# Patient Record
Sex: MALE | Race: WHITE | NOT HISPANIC OR LATINO | Employment: OTHER | URBAN - METROPOLITAN AREA
[De-identification: names, ages, dates, MRNs, and addresses within clinical notes are randomized per-mention and may not be internally consistent; named-entity substitution may affect disease eponyms.]

---

## 2023-02-22 LAB
ALANINE AMINOTRANSFERASE (SGPT) (U/L) IN SER/PLAS: 21 U/L (ref 10–52)
ALBUMIN (G/DL) IN SER/PLAS: 4.3 G/DL (ref 3.4–5)
ALKALINE PHOSPHATASE (U/L) IN SER/PLAS: 50 U/L (ref 33–136)
ANION GAP IN SER/PLAS: 11 MMOL/L (ref 10–20)
ASPARTATE AMINOTRANSFERASE (SGOT) (U/L) IN SER/PLAS: 19 U/L (ref 9–39)
BILIRUBIN TOTAL (MG/DL) IN SER/PLAS: 0.5 MG/DL (ref 0–1.2)
CALCIUM (MG/DL) IN SER/PLAS: 9 MG/DL (ref 8.6–10.6)
CARBON DIOXIDE, TOTAL (MMOL/L) IN SER/PLAS: 28 MMOL/L (ref 21–32)
CHLORIDE (MMOL/L) IN SER/PLAS: 103 MMOL/L (ref 98–107)
CHOLESTEROL (MG/DL) IN SER/PLAS: 149 MG/DL (ref 0–199)
CHOLESTEROL IN HDL (MG/DL) IN SER/PLAS: 58.8 MG/DL
CHOLESTEROL/HDL RATIO: 2.5
COBALAMIN (VITAMIN B12) (PG/ML) IN SER/PLAS: 1124 PG/ML (ref 211–911)
CREATININE (MG/DL) IN SER/PLAS: 0.86 MG/DL (ref 0.5–1.3)
ERYTHROCYTE DISTRIBUTION WIDTH (RATIO) BY AUTOMATED COUNT: 12.9 % (ref 11.5–14.5)
ERYTHROCYTE MEAN CORPUSCULAR HEMOGLOBIN CONCENTRATION (G/DL) BY AUTOMATED: 33.3 G/DL (ref 32–36)
ERYTHROCYTE MEAN CORPUSCULAR VOLUME (FL) BY AUTOMATED COUNT: 91 FL (ref 80–100)
ERYTHROCYTES (10*6/UL) IN BLOOD BY AUTOMATED COUNT: 4.92 X10E12/L (ref 4.5–5.9)
GFR MALE: >90 ML/MIN/1.73M2
GLUCOSE (MG/DL) IN SER/PLAS: 94 MG/DL (ref 74–99)
HEMATOCRIT (%) IN BLOOD BY AUTOMATED COUNT: 45 % (ref 41–52)
HEMOGLOBIN (G/DL) IN BLOOD: 15 G/DL (ref 13.5–17.5)
LDL: 68 MG/DL (ref 0–99)
LEUKOCYTES (10*3/UL) IN BLOOD BY AUTOMATED COUNT: 9 X10E9/L (ref 4.4–11.3)
NRBC (PER 100 WBCS) BY AUTOMATED COUNT: 0 /100 WBC (ref 0–0)
PLATELETS (10*3/UL) IN BLOOD AUTOMATED COUNT: 230 X10E9/L (ref 150–450)
POTASSIUM (MMOL/L) IN SER/PLAS: 4.4 MMOL/L (ref 3.5–5.3)
PROSTATE SPECIFIC ANTIGEN,SCREEN: 0.91 NG/ML (ref 0–4)
PROTEIN TOTAL: 6.8 G/DL (ref 6.4–8.2)
SODIUM (MMOL/L) IN SER/PLAS: 138 MMOL/L (ref 136–145)
THYROTROPIN (MIU/L) IN SER/PLAS BY DETECTION LIMIT <= 0.05 MIU/L: 1.74 MIU/L (ref 0.44–3.98)
TRIGLYCERIDE (MG/DL) IN SER/PLAS: 110 MG/DL (ref 0–149)
UREA NITROGEN (MG/DL) IN SER/PLAS: 17 MG/DL (ref 6–23)
VLDL: 22 MG/DL (ref 0–40)

## 2023-04-25 DIAGNOSIS — M25.572 ACUTE LEFT ANKLE PAIN: ICD-10-CM

## 2023-04-28 DIAGNOSIS — M89.9 BONE LESION: ICD-10-CM

## 2023-07-11 PROBLEM — G47.33 OBSTRUCTIVE SLEEP APNEA: Status: ACTIVE | Noted: 2023-07-11

## 2023-07-11 PROBLEM — M19.079 ANKLE ARTHRITIS: Status: ACTIVE | Noted: 2023-07-11

## 2023-07-11 PROBLEM — M93.20 OSTEOCHONDRITIS DISSECANS: Status: ACTIVE | Noted: 2023-07-11

## 2023-07-11 PROBLEM — M19.011 LOCALIZED OSTEOARTHRITIS OF RIGHT SHOULDER: Status: ACTIVE | Noted: 2023-07-11

## 2023-07-11 PROBLEM — H90.3 ASYMMETRICAL SENSORINEURAL HEARING LOSS: Status: ACTIVE | Noted: 2023-07-11

## 2023-07-11 PROBLEM — L81.9 PIGMENTED SKIN LESION: Status: ACTIVE | Noted: 2023-07-11

## 2023-07-11 PROBLEM — M19.012 DEGENERATIVE JOINT DISEASE OF LEFT SHOULDER: Status: ACTIVE | Noted: 2023-07-11

## 2023-07-11 PROBLEM — E78.5 HYPERLIPEMIA: Status: ACTIVE | Noted: 2023-07-11

## 2023-07-11 PROBLEM — K21.9 GERD (GASTROESOPHAGEAL REFLUX DISEASE): Status: ACTIVE | Noted: 2023-07-11

## 2023-07-11 PROBLEM — N52.9 ERECTILE DYSFUNCTION: Status: ACTIVE | Noted: 2023-07-11

## 2023-07-11 PROBLEM — M62.81 GENERALIZED MUSCLE WEAKNESS: Status: ACTIVE | Noted: 2023-07-11

## 2023-07-11 RX ORDER — KETOCONAZOLE 20 MG/G
CREAM TOPICAL
COMMUNITY
Start: 2023-05-22

## 2023-07-11 RX ORDER — SILDENAFIL 100 MG/1
TABLET, FILM COATED ORAL
COMMUNITY
Start: 2017-09-13

## 2023-07-11 RX ORDER — KETOCONAZOLE 20 MG/ML
SHAMPOO, SUSPENSION TOPICAL
COMMUNITY
Start: 2015-06-08 | End: 2024-02-20 | Stop reason: ALTCHOICE

## 2023-07-31 ENCOUNTER — OFFICE VISIT (OUTPATIENT)
Dept: PRIMARY CARE | Facility: CLINIC | Age: 68
End: 2023-07-31
Payer: MEDICARE

## 2023-07-31 VITALS
WEIGHT: 220 LBS | HEIGHT: 67 IN | SYSTOLIC BLOOD PRESSURE: 134 MMHG | DIASTOLIC BLOOD PRESSURE: 85 MMHG | BODY MASS INDEX: 34.53 KG/M2

## 2023-07-31 DIAGNOSIS — R73.09 ABNORMAL GLUCOSE: ICD-10-CM

## 2023-07-31 DIAGNOSIS — Z00.00 ROUTINE GENERAL MEDICAL EXAMINATION AT A HEALTH CARE FACILITY: Primary | ICD-10-CM

## 2023-07-31 DIAGNOSIS — E78.00 PURE HYPERCHOLESTEROLEMIA: ICD-10-CM

## 2023-07-31 DIAGNOSIS — E66.01 SEVERE OBESITY (BMI 35.0-35.9 WITH COMORBIDITY) (MULTI): ICD-10-CM

## 2023-07-31 DIAGNOSIS — G47.33 OBSTRUCTIVE SLEEP APNEA: ICD-10-CM

## 2023-07-31 LAB
ALANINE AMINOTRANSFERASE (SGPT) (U/L) IN SER/PLAS: 20 U/L (ref 10–52)
ALBUMIN (G/DL) IN SER/PLAS: 4.2 G/DL (ref 3.4–5)
ALKALINE PHOSPHATASE (U/L) IN SER/PLAS: 44 U/L (ref 33–136)
ANION GAP IN SER/PLAS: 12 MMOL/L (ref 10–20)
ASPARTATE AMINOTRANSFERASE (SGOT) (U/L) IN SER/PLAS: 23 U/L (ref 9–39)
BILIRUBIN TOTAL (MG/DL) IN SER/PLAS: 0.6 MG/DL (ref 0–1.2)
CALCIUM (MG/DL) IN SER/PLAS: 9.3 MG/DL (ref 8.6–10.6)
CARBON DIOXIDE, TOTAL (MMOL/L) IN SER/PLAS: 27 MMOL/L (ref 21–32)
CHLORIDE (MMOL/L) IN SER/PLAS: 105 MMOL/L (ref 98–107)
CHOLESTEROL (MG/DL) IN SER/PLAS: 159 MG/DL (ref 0–199)
CHOLESTEROL IN HDL (MG/DL) IN SER/PLAS: 55.3 MG/DL
CHOLESTEROL/HDL RATIO: 2.9
CREATININE (MG/DL) IN SER/PLAS: 0.82 MG/DL (ref 0.5–1.3)
GFR MALE: >90 ML/MIN/1.73M2
GLUCOSE (MG/DL) IN SER/PLAS: 136 MG/DL (ref 74–99)
LDL: 86 MG/DL (ref 0–99)
POC HEMOGLOBIN A1C: 5.6 % (ref 4.2–6.5)
POTASSIUM (MMOL/L) IN SER/PLAS: 4.2 MMOL/L (ref 3.5–5.3)
PROTEIN TOTAL: 6.7 G/DL (ref 6.4–8.2)
SODIUM (MMOL/L) IN SER/PLAS: 140 MMOL/L (ref 136–145)
TRIGLYCERIDE (MG/DL) IN SER/PLAS: 89 MG/DL (ref 0–149)
UREA NITROGEN (MG/DL) IN SER/PLAS: 22 MG/DL (ref 6–23)
VLDL: 18 MG/DL (ref 0–40)

## 2023-07-31 PROCEDURE — 1036F TOBACCO NON-USER: CPT | Performed by: FAMILY MEDICINE

## 2023-07-31 PROCEDURE — 80061 LIPID PANEL: CPT

## 2023-07-31 PROCEDURE — 83036 HEMOGLOBIN GLYCOSYLATED A1C: CPT | Performed by: FAMILY MEDICINE

## 2023-07-31 PROCEDURE — 99214 OFFICE O/P EST MOD 30 MIN: CPT | Performed by: FAMILY MEDICINE

## 2023-07-31 PROCEDURE — 3008F BODY MASS INDEX DOCD: CPT | Performed by: FAMILY MEDICINE

## 2023-07-31 PROCEDURE — 99397 PER PM REEVAL EST PAT 65+ YR: CPT | Performed by: FAMILY MEDICINE

## 2023-07-31 PROCEDURE — 1159F MED LIST DOCD IN RCRD: CPT | Performed by: FAMILY MEDICINE

## 2023-07-31 PROCEDURE — 80053 COMPREHEN METABOLIC PANEL: CPT

## 2023-07-31 ASSESSMENT — PATIENT HEALTH QUESTIONNAIRE - PHQ9
1. LITTLE INTEREST OR PLEASURE IN DOING THINGS: NOT AT ALL
2. FEELING DOWN, DEPRESSED OR HOPELESS: NOT AT ALL
SUM OF ALL RESPONSES TO PHQ9 QUESTIONS 1 AND 2: 0

## 2023-07-31 NOTE — PROGRESS NOTES
"Subjective   Patient ID: Tyrone Ma is a 68 y.o. male who presents for Follow-up (6 month /Cpap follow up face to face) and Weight Management (Possible ozepmic hard to workout with left ankle problem ).      Has been compliant with CPAP using 4-8 hours per night Benefits from CPAP due to sleep apnea HTN .    Last Physical : ___1_ Years ago     Pt's PMH, PSH, SH, FH , meds and allergies was obtained / reviewed and updated .     Dental  Visits : Y  Vision issues : N  Hearing issues : N    Immunizations : Y    Diet :  Could be better   Exercise:  Not regularly  Weight concerns : None    Alcohol: as noted in SH  Tobacco: as noted in SH  Recreational drugs :  None /as noted in      Sexually active : Active   Contraception :   Erectile dysfunction :    Colorectal cancer screening   Prostate cancer screening     Metabolic screening   - Lipids   - Glucose   Taking statin arthralgias no myalgia  Has arthritis      Weight has been hard to lose    ==================================    Visit Vitals  Blood Pressure 134/85   Height 1.71 m (5' 7.32\")   Weight 99.8 kg (220 lb)   Body Mass Index 34.13 kg/m²   Smoking Status Never   Body Surface Area 2.18 m²      =====================  Review of Systems:    Constitutional: no chills, no fever and no night sweats.     Eyes: no blurred vision and no eyesight problems.     ENT: no hearing loss, no nasal congestion, no nasal discharge, no hoarseness and no sore throat.     Cardiovascular: no chest pain, no intermittent leg claudication, no lower extremity edema, no palpitations and no syncope.     Respiratory: no cough, no shortness of breath during exertion, no shortness of breath at rest and no wheezing.     Gastrointestinal: no abdominal pain, no constipation, no blood in stools, no diarrhea, no melena, no nausea, no rectal pain and no vomiting.     Genitourinary: no dysuria, no change in urinary frequency, no urinary hesitancy and no feelings of urinary urgency. "     Musculoskeletal: no arthralgias, no back pain and no myalgias.     Integumentary: no new skin lesions and no rashes.     Neurological: no difficulty walking, no headache, no limb weakness, no numbness and no tingling.     Psychiatric: no anxiety, no depression, no anhedonia and no substance use disorders.     Endocrine: no recent weight gain and no recent weight loss.     Hematologic/Lymphatic: no tendency for easy bruising and no swollen glands.          All other systems have been reviewed and are negative for complaint.    =====================================================    Physical exam :    Constitutional: Alert and in no acute distress. Well developed, well nourished.     Eyes: Normal external exam. Pupils were equal in size, round, reactive to light (PERRL) with normal accommodation and extraocular movements intact (EOMI).     Ears, Nose, Mouth, and Throat: External inspection of ears and nose: Normal.  Otoscopic examination: Normal.      Neck: No neck mass was observed. Supple.     Cardiovascular: Heart rate and rhythm were normal, normal S1 and S2, no gallops, no murmurs and no pericardial rub    Pulmonary: No respiratory distress. Clear bilateral breath sounds.     Abdomen: Soft nontender; no abdominal mass palpated. No organomegaly.     Musculoskeletal: No joint swelling seen, normal movements of all extremities. Range of motion: Normal.  Muscle strength/tone: Normal.      Skin: Normal skin color and pigmentation, normal skin turgor, and no rash.     Neurologic: Deep tendon reflexes were 2+ and symmetric. Sensation: Normal.     Psychiatric: Judgment and insight: Intact. Mood and affect: Normal.    Lymphatic : Cervical/ axillary/ groin Lns Palpable/ non palpable       Assessment/Plan    Problem List Items Addressed This Visit       Hyperlipemia     Continue medication check labs continue healthy eating         Relevant Orders    Comprehensive Metabolic Panel (Completed)    Lipid Panel (Completed)     Obstructive sleep apnea - Primary     Refer to ENT to evaluate for inspire  Continue to increase compliance with CPAP         Relevant Orders    Referral to ENT    Severe obesity (BMI 35.0-35.9 with comorbidity) (CMS/[facility])    Relevant Medications    semaglutide 0.25 mg or 0.5 mg (2 mg/3 mL) pen injector    Abnormal glucose    Relevant Orders    POCT glycosylated hemoglobin (Hb A1C) manually resulted (Completed)

## 2023-08-06 PROBLEM — R73.09 ABNORMAL GLUCOSE: Status: ACTIVE | Noted: 2023-08-06

## 2023-08-06 PROBLEM — E66.01 SEVERE OBESITY (BMI 35.0-35.9 WITH COMORBIDITY) (MULTI): Status: ACTIVE | Noted: 2023-08-06

## 2023-08-15 DIAGNOSIS — E66.01 SEVERE OBESITY (BMI 35.0-35.9 WITH COMORBIDITY) (MULTI): ICD-10-CM

## 2023-08-15 RX ORDER — SEMAGLUTIDE 0.68 MG/ML
0.25 INJECTION, SOLUTION SUBCUTANEOUS
Qty: 3 ML | Refills: 1 | Status: SHIPPED | OUTPATIENT
Start: 2023-08-15 | End: 2023-10-31 | Stop reason: SDUPTHER

## 2023-08-31 ENCOUNTER — APPOINTMENT (OUTPATIENT)
Dept: PRIMARY CARE | Facility: CLINIC | Age: 68
End: 2023-08-31
Payer: MEDICARE

## 2023-10-03 ENCOUNTER — APPOINTMENT (OUTPATIENT)
Dept: ORTHOPEDIC SURGERY | Facility: CLINIC | Age: 68
End: 2023-10-03
Payer: MEDICARE

## 2023-10-04 DIAGNOSIS — E78.5 HYPERLIPIDEMIA, UNSPECIFIED: ICD-10-CM

## 2023-10-04 RX ORDER — ROSUVASTATIN CALCIUM 10 MG/1
10 TABLET, COATED ORAL DAILY
Qty: 90 TABLET | Refills: 1 | Status: SHIPPED | OUTPATIENT
Start: 2023-10-04 | End: 2024-04-10

## 2023-10-18 ENCOUNTER — OFFICE VISIT (OUTPATIENT)
Dept: OTOLARYNGOLOGY | Facility: CLINIC | Age: 68
End: 2023-10-18
Payer: MEDICARE

## 2023-10-18 VITALS
WEIGHT: 214.2 LBS | SYSTOLIC BLOOD PRESSURE: 136 MMHG | HEIGHT: 69 IN | BODY MASS INDEX: 31.73 KG/M2 | RESPIRATION RATE: 18 BRPM | HEART RATE: 50 BPM | TEMPERATURE: 97.2 F | DIASTOLIC BLOOD PRESSURE: 88 MMHG

## 2023-10-18 DIAGNOSIS — G47.33 OBSTRUCTIVE SLEEP APNEA: Primary | ICD-10-CM

## 2023-10-18 PROCEDURE — 99203 OFFICE O/P NEW LOW 30 MIN: CPT | Performed by: STUDENT IN AN ORGANIZED HEALTH CARE EDUCATION/TRAINING PROGRAM

## 2023-10-18 PROCEDURE — 99213 OFFICE O/P EST LOW 20 MIN: CPT | Performed by: STUDENT IN AN ORGANIZED HEALTH CARE EDUCATION/TRAINING PROGRAM

## 2023-10-18 PROCEDURE — 1036F TOBACCO NON-USER: CPT | Performed by: STUDENT IN AN ORGANIZED HEALTH CARE EDUCATION/TRAINING PROGRAM

## 2023-10-18 PROCEDURE — 1160F RVW MEDS BY RX/DR IN RCRD: CPT | Performed by: STUDENT IN AN ORGANIZED HEALTH CARE EDUCATION/TRAINING PROGRAM

## 2023-10-18 PROCEDURE — 1159F MED LIST DOCD IN RCRD: CPT | Performed by: STUDENT IN AN ORGANIZED HEALTH CARE EDUCATION/TRAINING PROGRAM

## 2023-10-18 PROCEDURE — 1126F AMNT PAIN NOTED NONE PRSNT: CPT | Performed by: STUDENT IN AN ORGANIZED HEALTH CARE EDUCATION/TRAINING PROGRAM

## 2023-10-18 PROCEDURE — 3008F BODY MASS INDEX DOCD: CPT | Performed by: STUDENT IN AN ORGANIZED HEALTH CARE EDUCATION/TRAINING PROGRAM

## 2023-10-18 SDOH — ECONOMIC STABILITY: FOOD INSECURITY: WITHIN THE PAST 12 MONTHS, THE FOOD YOU BOUGHT JUST DIDN'T LAST AND YOU DIDN'T HAVE MONEY TO GET MORE.: NEVER TRUE

## 2023-10-18 SDOH — ECONOMIC STABILITY: FOOD INSECURITY: WITHIN THE PAST 12 MONTHS, YOU WORRIED THAT YOUR FOOD WOULD RUN OUT BEFORE YOU GOT MONEY TO BUY MORE.: NEVER TRUE

## 2023-10-18 ASSESSMENT — PATIENT HEALTH QUESTIONNAIRE - PHQ9
1. LITTLE INTEREST OR PLEASURE IN DOING THINGS: NOT AT ALL
SUM OF ALL RESPONSES TO PHQ9 QUESTIONS 1 AND 2: 0
2. FEELING DOWN, DEPRESSED OR HOPELESS: NOT AT ALL

## 2023-10-18 ASSESSMENT — COLUMBIA-SUICIDE SEVERITY RATING SCALE - C-SSRS
2. HAVE YOU ACTUALLY HAD ANY THOUGHTS OF KILLING YOURSELF?: NO
6. HAVE YOU EVER DONE ANYTHING, STARTED TO DO ANYTHING, OR PREPARED TO DO ANYTHING TO END YOUR LIFE?: NO
1. IN THE PAST MONTH, HAVE YOU WISHED YOU WERE DEAD OR WISHED YOU COULD GO TO SLEEP AND NOT WAKE UP?: NO

## 2023-10-18 ASSESSMENT — ENCOUNTER SYMPTOMS
LOSS OF SENSATION IN FEET: 0
DEPRESSION: 0
OCCASIONAL FEELINGS OF UNSTEADINESS: 0

## 2023-10-18 ASSESSMENT — PAIN SCALES - GENERAL: PAINLEVEL: 0-NO PAIN

## 2023-10-18 NOTE — PROGRESS NOTES
Subjective   Patient ID: Tyrone Ma is a 68 y.o. male who presents for Sleep Apnea (NPV- Sleep Apnea.).  Referred by Dr. Sol for obstructive sleep apnea and consideration of surgical intervention.    The patient recently started CPAP in May 2023. Using CPAP 4-5 hours a night. He has not appreciated much improvement in sleep quality. His PCP and sleep equipment company are currently managing his CPAP and mask optimization.  He has changed his mask somewhat and feels that this ihas improved his compliance. Notes occasional SOB which causes him to take off his mask.        Review of Systems   All other systems reviewed and are negative.    Except as noted on patient intake form and as above.    Objective   Physical Exam  Constitutional:       General: He is not in acute distress.     Appearance: Normal appearance.   HENT:      Head: Normocephalic and atraumatic.      Salivary Glands: Right salivary gland is not diffusely enlarged. Left salivary gland is not diffusely enlarged.      Right Ear: Tympanic membrane, ear canal and external ear normal.      Left Ear: Tympanic membrane, ear canal and external ear normal.      Nose: Septal deviation present. No nasal deformity, mucosal edema or rhinorrhea.      Right Turbinates: Enlarged.      Left Turbinates: Enlarged.      Comments: Right NSD. Mild ITH.     Mouth/Throat:      Mouth: Mucous membranes are moist.      Pharynx: Oropharynx is clear.      Comments: Class I airway.  Class II malocclusion.  Moderate attrition.  Eyes:      Extraocular Movements: Extraocular movements intact.      Conjunctiva/sclera: Conjunctivae normal.   Neck:      Thyroid: No thyroid mass or thyromegaly.   Pulmonary:      Effort: Pulmonary effort is normal. No respiratory distress.      Breath sounds: No stridor.   Lymphadenopathy:      Cervical: No cervical adenopathy.   Neurological:      Mental Status: He is alert.      Cranial Nerves: Cranial nerves 2-12 are intact. No cranial nerve  deficit, dysarthria or facial asymmetry.   Psychiatric:         Attention and Perception: Attention normal.         Mood and Affect: Mood normal.         Speech: Speech normal.         Behavior: Behavior is cooperative.       Sleep Study:  I personally reviewed the patient's sleep study results from March 2023.  This appears to be a good quality study.  The patient's estimated AHI was just over 50.  He spent almost 50% of this time with oxygen saturation of less than 90%.  Two thirds of his events were obstructive in the third were central or mixed in nature.  He was graded as a severe obstructive sleep apnea.    Assessment/Plan   Problem List Items Addressed This Visit             ICD-10-CM    Obstructive sleep apnea - Primary G47.33     The patient has recently been diagnosed with severe obstructive sleep apnea.  I discussed with the patient his sleep study results as well as his physical exam in detail; reassurance was provided.  The patient has severe sleep apnea that is a mixture of obstructive and central events.  Given the patient's anatomy we discussed that he is a poor candidate for consideration of [place] pharyngeal surgery.  In addition, because of his multiple central apneas he would not be a good inspire candidate.  I recommended that given his sleep study results he continue CPAP and work with his company to continue to optimize his usage.    However, if after 6 to 12 months of CPAP use he is finding persistent difficulty it would not be unreasonable to see one of my sleep surgeon colleagues to discuss options to optimize his anatomy.  He may benefit from straightening of the septum and would also likely benefit from advancement of his mandible given his overbite.    He will otherwise see me as needed.

## 2023-10-18 NOTE — LETTER
October 18, 2023     Giancarlo Sol MD  [address]  [facility] [address]  [facility] [address]    Patient: Tyrone Ma   YOB: 1955   Date of Visit: 10/18/2023       Dear Dr. Giancarlo Sol MD:    Thank you for referring Tyrone Ma to me for evaluation. Below are my notes for this consultation.  If you have questions, please do not hesitate to call me. I look forward to following your patient along with you.       Sincerely,     Pedro Hackett MD      CC: No Recipients  ______________________________________________________________________________________    Subjective   Patient ID: Tyrone Ma is a 68 y.o. male who presents for Sleep Apnea (NPV- Sleep Apnea.).  Referred by Dr. Sol for obstructive sleep apnea and consideration of surgical intervention.    The patient recently started CPAP in May 2023. Using CPAP 4-5 hours a night. He has not appreciated much improvement in sleep quality. His PCP and sleep equipment company are currently managing his CPAP and mask optimization.  He has changed his mask somewhat and feels that this ihas improved his compliance. Notes occasional SOB which causes him to take off his mask.        Review of Systems   All other systems reviewed and are negative.    Except as noted on patient intake form and as above.    Objective   Physical Exam  Constitutional:       General: He is not in acute distress.     Appearance: Normal appearance.   HENT:      Head: Normocephalic and atraumatic.      Salivary Glands: Right salivary gland is not diffusely enlarged. Left salivary gland is not diffusely enlarged.      Right Ear: Tympanic membrane, ear canal and external ear normal.      Left Ear: Tympanic membrane, ear canal and external ear normal.      Nose: Septal deviation present. No nasal deformity, mucosal edema or rhinorrhea.      Right Turbinates: Enlarged.      Left Turbinates: Enlarged.      Comments: Right NSD. Mild ITH.     Mouth/Throat:      Mouth: Mucous membranes are moist.       Pharynx: Oropharynx is clear.      Comments: Class I airway.  Class II malocclusion.  Moderate attrition.  Eyes:      Extraocular Movements: Extraocular movements intact.      Conjunctiva/sclera: Conjunctivae normal.   Neck:      Thyroid: No thyroid mass or thyromegaly.   Pulmonary:      Effort: Pulmonary effort is normal. No respiratory distress.      Breath sounds: No stridor.   Lymphadenopathy:      Cervical: No cervical adenopathy.   Neurological:      Mental Status: He is alert.      Cranial Nerves: Cranial nerves 2-12 are intact. No cranial nerve deficit, dysarthria or facial asymmetry.   Psychiatric:         Attention and Perception: Attention normal.         Mood and Affect: Mood normal.         Speech: Speech normal.         Behavior: Behavior is cooperative.       Sleep Study:  I personally reviewed the patient's sleep study results from March 2023.  This appears to be a good quality study.  The patient's estimated AHI was just over 50.  He spent almost 50% of this time with oxygen saturation of less than 90%.  Two thirds of his events were obstructive in the third were central or mixed in nature.  He was graded as a severe obstructive sleep apnea.    Assessment/Plan   Problem List Items Addressed This Visit             ICD-10-CM    Obstructive sleep apnea - Primary G47.33     The patient has recently been diagnosed with severe obstructive sleep apnea.  I discussed with the patient his sleep study results as well as his physical exam in detail; reassurance was provided.  The patient has severe sleep apnea that is a mixture of obstructive and central events.  Given the patient's anatomy we discussed that he is a poor candidate for consideration of [place] pharyngeal surgery.  In addition, because of his multiple central apneas he would not be a good inspire candidate.  I recommended that given his sleep study results he continue CPAP and work with his company to continue to optimize his  usage.    However, if after 6 to 12 months of CPAP use he is finding persistent difficulty it would not be unreasonable to see one of my sleep surgeon colleagues to discuss options to optimize his anatomy.  He may benefit from straightening of the septum and would also likely benefit from advancement of his mandible given his overbite.    He will otherwise see me as needed.

## 2023-10-19 NOTE — ASSESSMENT & PLAN NOTE
The patient has recently been diagnosed with severe obstructive sleep apnea.  I discussed with the patient his sleep study results as well as his physical exam in detail; reassurance was provided.  The patient has severe sleep apnea that is a mixture of obstructive and central events.  Given the patient's anatomy we discussed that he is a poor candidate for consideration of [place] pharyngeal surgery.  In addition, because of his multiple central apneas he would not be a good inspire candidate.  I recommended that given his sleep study results he continue CPAP and work with his company to continue to optimize his usage.    However, if after 6 to 12 months of CPAP use he is finding persistent difficulty it would not be unreasonable to see one of my sleep surgeon colleagues to discuss options to optimize his anatomy.  He may benefit from straightening of the septum and would also likely benefit from advancement of his mandible given his overbite.    He will otherwise see me as needed.

## 2023-10-30 ENCOUNTER — APPOINTMENT (OUTPATIENT)
Dept: OTOLARYNGOLOGY | Facility: CLINIC | Age: 68
End: 2023-10-30
Payer: MEDICARE

## 2023-10-31 DIAGNOSIS — E66.01 SEVERE OBESITY (BMI 35.0-35.9 WITH COMORBIDITY) (MULTI): ICD-10-CM

## 2023-10-31 RX ORDER — SEMAGLUTIDE 0.68 MG/ML
0.5 INJECTION, SOLUTION SUBCUTANEOUS
Qty: 3 ML | Refills: 0 | Status: SHIPPED | OUTPATIENT
Start: 2023-10-31 | End: 2023-12-21

## 2023-11-03 ENCOUNTER — APPOINTMENT (OUTPATIENT)
Dept: ORTHOPEDIC SURGERY | Facility: HOSPITAL | Age: 68
End: 2023-11-03
Payer: MEDICARE

## 2023-11-07 ENCOUNTER — OFFICE VISIT (OUTPATIENT)
Dept: ORTHOPEDIC SURGERY | Facility: HOSPITAL | Age: 68
End: 2023-11-07
Payer: MEDICARE

## 2023-11-07 VITALS — HEIGHT: 67 IN | WEIGHT: 207 LBS | BODY MASS INDEX: 32.49 KG/M2

## 2023-11-07 DIAGNOSIS — G89.29 CHRONIC RIGHT SHOULDER PAIN: Primary | ICD-10-CM

## 2023-11-07 DIAGNOSIS — M25.511 CHRONIC RIGHT SHOULDER PAIN: Primary | ICD-10-CM

## 2023-11-07 PROCEDURE — 1126F AMNT PAIN NOTED NONE PRSNT: CPT | Performed by: ORTHOPAEDIC SURGERY

## 2023-11-07 PROCEDURE — 99213 OFFICE O/P EST LOW 20 MIN: CPT | Performed by: ORTHOPAEDIC SURGERY

## 2023-11-07 PROCEDURE — 20610 DRAIN/INJ JOINT/BURSA W/O US: CPT | Performed by: ORTHOPAEDIC SURGERY

## 2023-11-07 PROCEDURE — 3008F BODY MASS INDEX DOCD: CPT | Performed by: ORTHOPAEDIC SURGERY

## 2023-11-07 PROCEDURE — 1160F RVW MEDS BY RX/DR IN RCRD: CPT | Performed by: ORTHOPAEDIC SURGERY

## 2023-11-07 PROCEDURE — 1036F TOBACCO NON-USER: CPT | Performed by: ORTHOPAEDIC SURGERY

## 2023-11-07 PROCEDURE — 1159F MED LIST DOCD IN RCRD: CPT | Performed by: ORTHOPAEDIC SURGERY

## 2023-11-07 ASSESSMENT — PAIN SCALES - GENERAL: PAINLEVEL_OUTOF10: 5 - MODERATE PAIN

## 2023-11-07 ASSESSMENT — PAIN - FUNCTIONAL ASSESSMENT: PAIN_FUNCTIONAL_ASSESSMENT: 0-10

## 2023-11-07 ASSESSMENT — PAIN DESCRIPTION - DESCRIPTORS: DESCRIPTORS: ACHING

## 2023-11-07 NOTE — PROGRESS NOTES
Subjective    Patient ID: Tyrone Ma is a 68 y.o. male.    Chief Complaint: Follow-up of the Right Shoulder (Injection)     Last Surgery: No surgery found  Last Surgery Date: No surgery found    The patient is a pleasant 68 year-old right hand dominant male presenting today for evaluation of their right shoulder. He is presenting today with complaints of shoulder pain that has been progressively worsening. He has had the pain for the last 3-4 years. He does not feel that the pain is terrible, but his wife says he complains about it often. He has not tried injections yet but he has done therapy. He did not have success with therapy. He notes that his left shoulder is beginning to hurt.        11/07/23  Tyrone returns to the clinic today for a repeat follow up visit regarding his right shoulder.     He explains today that the injections are helping. He is hoping to have a repeat done today.      Past medical history, surgical history, social history, and family history were all reviewed and are as per the [place] patient health history questionnaire form that I signed and scanned into the chart today.          Objective   Patient is a well-developed, well-nourished male in no acute distress. Breathes with normal chest rises. Pupils are round and symmetric today. Awake, alert, and oriented x3.     Examination of the left shoulder today reveals the skin to be intact. There is no sign any atrophy lesions or abrasions. There is no pain to palpation of the bony prominences. Cervical lymphadenopathy examined, and this was negative.   Patient had 5 out of 5 wrist flexors extension, thumb extension bilaterally. Sensation was intact to light touch to median, ulnar, radial axillary, and musculocutaneous nerves bilaterally. Positive radial pulse bilaterally. Provocative maneuvers on the left side today were negative.   Range of motion of the left shoulder revealed 0-170° of forward elevation. 0-45° of external rotation.  Internal rotation was to T 12.      Examination of the right shoulder today reveals the skin to be intact. There is no sign any atrophy lesions or abrasions. There is no pain to palpation of the bony prominences. Cervical lymphadenopathy examined, and this was negative.   Patient had 5 out of 5 wrist flexors extension, thumb extension, bilaterally. Sensation was intact to light touch to median, ulnar, radial, axillary, and musculocutaneous nerves bilaterally. Positive radial pulse bilaterally. Provocative maneuvers on the right side today were negative.   Range of motion of the right shoulder revealed 0-130° of forward elevation, passively correctable to 17. 0-35° of external rotation. And internal rotation up to T 12. Negative modified belly press test      Image Results:    Patient ID: Tyrone Ma is a 68 y.o. male.    L Inj/Asp: R subacromial bursa on 11/7/2023 8:59 AM  Indications: pain  Details: 20 G needle, anterior approach  Outcome: tolerated well, no immediate complications  Procedure, treatment alternatives, risks and benefits explained, specific risks discussed. Consent was given by the patient. Immediately prior to procedure a time out was called to verify the correct patient, procedure, equipment, support staff and site/side marked as required. Patient was prepped and draped in the usual sterile fashion.           Assessment/Plan   Encounter Diagnoses:  No diagnosis found.  Patient with endstage arthritis of the right shoulder     At this time we had a long discussion about the various options for shoulder arthritis.      1. Do nothing and continue activity modifications.   2. Consider cortisone injections into the glenohumeral joint. This would give pain relief that is temporary. This would not stop the progression of arthritis. For some patients, this injection can provide no relief at all, relief for 2 weeks, 4 weeks, 3 months or longer. The risk of the injection is fairly minimal but does include  included a very small chance of infection.   3. Option is a total shoulder replacement. This will give the patient a more permanent solution to pain relief. It would also improve function. There is no rush to this procedure it is an elective procedure.     We did a repeat injection today per his request. He tolerated this well. As he is getting relief from injections we will continue these until they stop working.   Orders Placed This Encounter    L Inj/Asp     Follow up in 3 months    Scribe Attestation  By signing my name below, I, Baltazar Roa   attest that this documentation has been prepared under the direction and in the presence of Victor Hugo Somers MD.

## 2023-12-21 DIAGNOSIS — E66.01 SEVERE OBESITY (BMI 35.0-35.9 WITH COMORBIDITY) (MULTI): ICD-10-CM

## 2023-12-21 RX ORDER — SEMAGLUTIDE 0.68 MG/ML
0.5 INJECTION, SOLUTION SUBCUTANEOUS
Qty: 3 ML | Refills: 1 | Status: SHIPPED | OUTPATIENT
Start: 2023-12-21 | End: 2024-02-20 | Stop reason: ALTCHOICE

## 2024-02-04 NOTE — PROGRESS NOTES
Subjective    Patient ID: Tyrone Ma is a 68 y.o. male.    Chief Complaint: No chief complaint on file.     Last Surgery: No surgery found  Last Surgery Date: No surgery found    The patient is a pleasant 68 year-old right hand dominant male presenting today for evaluation of their right shoulder. He is presenting today with complaints of shoulder pain that has been progressively worsening. He has had the pain for the last 3-4 years. He does not feel that the pain is terrible, but his wife says he complains about it often. He has not tried injections yet but he has done therapy. He did not have success with therapy. He notes that his left shoulder is beginning to hurt.        11/07/23  Tyrone returns to the clinic today for a repeat follow up visit regarding his right shoulder.     He explains today that the injections are helping. He is hoping to have a repeat done today.     02/06/24  Tyrone returns to the clinic today for a follow up visit regarding his right shoulder.     He reports that the injections are continuing to take the edge off his shoulder pain.      Past medical history, surgical history, social history, and family history were all reviewed and are as per the [place] patient health history questionnaire form that I signed and scanned into the chart today.          Objective   Patient is a well-developed, well-nourished male in no acute distress. Breathes with normal chest rises. Pupils are round and symmetric today. Awake, alert, and oriented x3.     Examination of the left shoulder today reveals the skin to be intact. There is no sign any atrophy lesions or abrasions. There is no pain to palpation of the bony prominences. Cervical lymphadenopathy examined, and this was negative.   Patient had 5 out of 5 wrist flexors extension, thumb extension bilaterally. Sensation was intact to light touch to median, ulnar, radial axillary, and musculocutaneous nerves bilaterally. Positive radial pulse  bilaterally. Provocative maneuvers on the left side today were negative.   Range of motion of the left shoulder revealed 0-170° of forward elevation. 0-45° of external rotation. Internal rotation was to T 12.      Examination of the right shoulder today reveals the skin to be intact. There is no sign any atrophy lesions or abrasions. There is no pain to palpation of the bony prominences. Cervical lymphadenopathy examined, and this was negative.   Patient had 5 out of 5 wrist flexors extension, thumb extension, bilaterally. Sensation was intact to light touch to median, ulnar, radial, axillary, and musculocutaneous nerves bilaterally. Positive radial pulse bilaterally. Provocative maneuvers on the right side today were negative.   Range of motion of the right shoulder revealed 0-130° of forward elevation, passively correctable to 17. 0-35° of external rotation. And internal rotation up to T 12. Negative modified belly press test      Image Results:    Patient ID: Tyrone Ma is a 68 y.o. male.    L Inj/Asp: R subacromial bursa on 2/6/2024 8:26 AM  Indications: pain  Details: 20 G needle, anterior approach  Outcome: tolerated well, no immediate complications  Procedure, treatment alternatives, risks and benefits explained, specific risks discussed. Consent was given by the patient. Immediately prior to procedure a time out was called to verify the correct patient, procedure, equipment, support staff and site/side marked as required. Patient was prepped and draped in the usual sterile fashion.           Assessment/Plan   Encounter Diagnoses:  No diagnosis found.  Patient with endstage arthritis of the right shoulder     At this time we had a long discussion about the various options for shoulder arthritis.      1. Do nothing and continue activity modifications.   2. Consider cortisone injections into the glenohumeral joint. This would give pain relief that is temporary. This would not stop the progression of  arthritis. For some patients, this injection can provide no relief at all, relief for 2 weeks, 4 weeks, 3 months or longer. The risk of the injection is fairly minimal but does include included a very small chance of infection.   3. Option is a total shoulder replacement. This will give the patient a more permanent solution to pain relief. It would also improve function. There is no rush to this procedure it is an elective procedure.     We did a repeat injection today as he is getting some relief from them still.  He tolerated this well. We will continue with injections until he is ready to consider a shoulder replacement. We provided him with a shoulder replacement handout today as well.   No orders of the defined types were placed in this encounter.    Follow up in 3 months    Scribe Attestation  By signing my name below, IErica Scribe   attest that this documentation has been prepared under the direction and in the presence of Victor Hugo Somers MD.

## 2024-02-06 ENCOUNTER — OFFICE VISIT (OUTPATIENT)
Dept: ORTHOPEDIC SURGERY | Facility: HOSPITAL | Age: 69
End: 2024-02-06
Payer: MEDICARE

## 2024-02-06 DIAGNOSIS — M25.511 CHRONIC RIGHT SHOULDER PAIN: Primary | ICD-10-CM

## 2024-02-06 DIAGNOSIS — G89.29 CHRONIC RIGHT SHOULDER PAIN: Primary | ICD-10-CM

## 2024-02-06 PROCEDURE — 3008F BODY MASS INDEX DOCD: CPT | Performed by: ORTHOPAEDIC SURGERY

## 2024-02-06 PROCEDURE — 99213 OFFICE O/P EST LOW 20 MIN: CPT | Performed by: ORTHOPAEDIC SURGERY

## 2024-02-06 PROCEDURE — 1126F AMNT PAIN NOTED NONE PRSNT: CPT | Performed by: ORTHOPAEDIC SURGERY

## 2024-02-06 PROCEDURE — 1159F MED LIST DOCD IN RCRD: CPT | Performed by: ORTHOPAEDIC SURGERY

## 2024-02-06 PROCEDURE — 20610 DRAIN/INJ JOINT/BURSA W/O US: CPT | Performed by: ORTHOPAEDIC SURGERY

## 2024-02-06 PROCEDURE — 1036F TOBACCO NON-USER: CPT | Performed by: ORTHOPAEDIC SURGERY

## 2024-02-06 PROCEDURE — 99213 OFFICE O/P EST LOW 20 MIN: CPT | Mod: 25,27 | Performed by: ORTHOPAEDIC SURGERY

## 2024-02-20 ENCOUNTER — OFFICE VISIT (OUTPATIENT)
Dept: PRIMARY CARE | Facility: CLINIC | Age: 69
End: 2024-02-20
Payer: MEDICARE

## 2024-02-20 VITALS
WEIGHT: 210 LBS | DIASTOLIC BLOOD PRESSURE: 76 MMHG | SYSTOLIC BLOOD PRESSURE: 111 MMHG | BODY MASS INDEX: 31.83 KG/M2 | HEIGHT: 68 IN | HEART RATE: 68 BPM

## 2024-02-20 DIAGNOSIS — Z12.5 PROSTATE CANCER SCREENING: ICD-10-CM

## 2024-02-20 DIAGNOSIS — R73.09 ABNORMAL GLUCOSE: ICD-10-CM

## 2024-02-20 DIAGNOSIS — E78.00 PURE HYPERCHOLESTEROLEMIA: ICD-10-CM

## 2024-02-20 DIAGNOSIS — G47.33 OBSTRUCTIVE SLEEP APNEA: ICD-10-CM

## 2024-02-20 DIAGNOSIS — Z00.00 ROUTINE GENERAL MEDICAL EXAMINATION AT HEALTH CARE FACILITY: Primary | ICD-10-CM

## 2024-02-20 DIAGNOSIS — E66.01 SEVERE OBESITY (BMI 35.0-35.9 WITH COMORBIDITY) (MULTI): ICD-10-CM

## 2024-02-20 LAB
ERYTHROCYTE [DISTWIDTH] IN BLOOD BY AUTOMATED COUNT: 12.7 % (ref 11.5–14.5)
HCT VFR BLD AUTO: 44.5 % (ref 41–52)
HGB BLD-MCNC: 15 G/DL (ref 13.5–17.5)
MCH RBC QN AUTO: 30.3 PG (ref 26–34)
MCHC RBC AUTO-ENTMCNC: 33.7 G/DL (ref 32–36)
MCV RBC AUTO: 90 FL (ref 80–100)
NRBC BLD-RTO: 0 /100 WBCS (ref 0–0)
PLATELET # BLD AUTO: 233 X10*3/UL (ref 150–450)
POC HEMOGLOBIN A1C: 5.4 % (ref 4.2–6.5)
RBC # BLD AUTO: 4.95 X10*6/UL (ref 4.5–5.9)
WBC # BLD AUTO: 9.9 X10*3/UL (ref 4.4–11.3)

## 2024-02-20 PROCEDURE — G0103 PSA SCREENING: HCPCS

## 2024-02-20 PROCEDURE — 1036F TOBACCO NON-USER: CPT | Performed by: FAMILY MEDICINE

## 2024-02-20 PROCEDURE — 83036 HEMOGLOBIN GLYCOSYLATED A1C: CPT | Performed by: FAMILY MEDICINE

## 2024-02-20 PROCEDURE — 80053 COMPREHEN METABOLIC PANEL: CPT

## 2024-02-20 PROCEDURE — 3008F BODY MASS INDEX DOCD: CPT | Performed by: FAMILY MEDICINE

## 2024-02-20 PROCEDURE — 1126F AMNT PAIN NOTED NONE PRSNT: CPT | Performed by: FAMILY MEDICINE

## 2024-02-20 PROCEDURE — 1124F ACP DISCUSS-NO DSCNMKR DOCD: CPT | Performed by: FAMILY MEDICINE

## 2024-02-20 PROCEDURE — 99214 OFFICE O/P EST MOD 30 MIN: CPT | Performed by: FAMILY MEDICINE

## 2024-02-20 PROCEDURE — 85027 COMPLETE CBC AUTOMATED: CPT

## 2024-02-20 PROCEDURE — 1159F MED LIST DOCD IN RCRD: CPT | Performed by: FAMILY MEDICINE

## 2024-02-20 PROCEDURE — 36415 COLL VENOUS BLD VENIPUNCTURE: CPT

## 2024-02-20 PROCEDURE — 1170F FXNL STATUS ASSESSED: CPT | Performed by: FAMILY MEDICINE

## 2024-02-20 PROCEDURE — G0439 PPPS, SUBSEQ VISIT: HCPCS | Performed by: FAMILY MEDICINE

## 2024-02-20 PROCEDURE — 84443 ASSAY THYROID STIM HORMONE: CPT

## 2024-02-20 PROCEDURE — 80061 LIPID PANEL: CPT

## 2024-02-20 PROCEDURE — G0447 BEHAVIOR COUNSEL OBESITY 15M: HCPCS | Performed by: FAMILY MEDICINE

## 2024-02-20 PROCEDURE — 1160F RVW MEDS BY RX/DR IN RCRD: CPT | Performed by: FAMILY MEDICINE

## 2024-02-20 ASSESSMENT — ACTIVITIES OF DAILY LIVING (ADL)
GROCERY_SHOPPING: INDEPENDENT
BATHING: INDEPENDENT
DRESSING: INDEPENDENT
TAKING_MEDICATION: INDEPENDENT
DOING_HOUSEWORK: INDEPENDENT
MANAGING_FINANCES: INDEPENDENT

## 2024-02-20 ASSESSMENT — ENCOUNTER SYMPTOMS
LOSS OF SENSATION IN FEET: 0
OCCASIONAL FEELINGS OF UNSTEADINESS: 0
DEPRESSION: 0

## 2024-02-20 NOTE — PROGRESS NOTES
"Subjective   Reason for Visit: Tyrone Ma is an 68 y.o. male here for a Medicare Wellness visit.     Past Medical, Surgical, and Family History reviewed and updated in chart.    Reviewed all medications by prescribing practitioner or clinical pharmacist (such as prescriptions, OTCs, herbal therapies and supplements) and documented in the medical record.    HPI  HTN well controlled  Benson statin    Patient Care Team:  Giancarlo Sol MD as PCP - General  Giancarlo Sol MD as PCP - [employer] PCP     Review of Systems   Constitutional:  Negative for chills and fever.   HENT: Negative.     Respiratory: Negative.     Cardiovascular: Negative.    Gastrointestinal: Negative.  Negative for nausea and vomiting.   Endocrine: Negative.    Genitourinary: Negative.    Musculoskeletal: Negative.    Skin: Negative.  Negative for rash.   Allergic/Immunologic: Negative.    Neurological: Negative.    Hematological: Negative.    Psychiatric/Behavioral: Negative.     All other systems reviewed and are negative.      Objective   Vitals:  Blood Pressure 111/76   Pulse 68   Height 1.715 m (5' 7.5\")   Weight 95.3 kg (210 lb)   Body Mass Index 32.41 kg/m²       Physical Exam  Constitutional:       Appearance: Normal appearance.   Cardiovascular:      Rate and Rhythm: Normal rate and regular rhythm.   Pulmonary:      Effort: Pulmonary effort is normal.      Breath sounds: Normal breath sounds.   Abdominal:      General: Bowel sounds are normal.   Neurological:      General: No focal deficit present.      Mental Status: He is alert.   Psychiatric:         Mood and Affect: Mood normal.         Assessment/Plan   Problem List Items Addressed This Visit       Hyperlipemia    Current Assessment & Plan     Continue medication check labs continue healthy eating         Relevant Orders    Comprehensive Metabolic Panel (Completed)    Lipid Panel (Completed)    Thyroid Stimulating Hormone (Completed)    CBC (Completed)    " Hemoglobin A1C    PSA (Completed)    Obstructive sleep apnea    Current Assessment & Plan     Rec using CPAP as ordered         Severe obesity (BMI 35.0-35.9 with comorbidity) (CMS/[facility])    Current Assessment & Plan     Watch what you eat and include more vegetables on your plate.  Portion control and exercise will help in loosing weight .   It is recommended to get 150 mins of brisk exercise in a week . 150 mins of exercise per week will help in maintaining your current weight, if you are already working out . Exercise should make your heart rate go up.   Calorie deficit ( spending more calories than eating ) will result in weight loss    A deficit of 500 calories per day in 7 days would results in 1lbs weight loss per week., Aim for 1-2 lbs weight loss per month.   You can reduce the calorie intake by 250 calories and spend 250 calories by working out which would give you a total deficit of 500 calories     No sugary/ sweetened beverages , portion control , dedicated physical activity atleast 5 times a week advised .            Abnormal glucose    Relevant Orders    POCT glycosylated hemoglobin (Hb A1C) manually resulted (Completed)    Comprehensive Metabolic Panel (Completed)    Lipid Panel (Completed)    Thyroid Stimulating Hormone (Completed)    CBC (Completed)    Hemoglobin A1C    PSA (Completed)    Routine general medical examination at health care facility - Primary    Prostate cancer screening    Relevant Orders    Comprehensive Metabolic Panel (Completed)    Lipid Panel (Completed)    Thyroid Stimulating Hormone (Completed)    CBC (Completed)    Hemoglobin A1C    PSA (Completed)    BMI 32.0-32.9,adult    Relevant Medications    tirzepatide, weight loss, (Zepbound) 2.5 mg/0.5 mL injection    Other Relevant Orders    Comprehensive Metabolic Panel (Completed)    Lipid Panel (Completed)    Thyroid Stimulating Hormone (Completed)    CBC (Completed)    Hemoglobin A1C    PSA (Completed)

## 2024-02-21 LAB
ALBUMIN SERPL BCP-MCNC: 4.2 G/DL (ref 3.4–5)
ALP SERPL-CCNC: 45 U/L (ref 33–136)
ALT SERPL W P-5'-P-CCNC: 20 U/L (ref 10–52)
ANION GAP SERPL CALC-SCNC: 13 MMOL/L (ref 10–20)
AST SERPL W P-5'-P-CCNC: 20 U/L (ref 9–39)
BILIRUB SERPL-MCNC: 0.6 MG/DL (ref 0–1.2)
BUN SERPL-MCNC: 18 MG/DL (ref 6–23)
CALCIUM SERPL-MCNC: 9.4 MG/DL (ref 8.6–10.6)
CHLORIDE SERPL-SCNC: 104 MMOL/L (ref 98–107)
CHOLEST SERPL-MCNC: 165 MG/DL (ref 0–199)
CHOLESTEROL/HDL RATIO: 2.8
CO2 SERPL-SCNC: 27 MMOL/L (ref 21–32)
CREAT SERPL-MCNC: 0.66 MG/DL (ref 0.5–1.3)
EGFRCR SERPLBLD CKD-EPI 2021: >90 ML/MIN/1.73M*2
GLUCOSE SERPL-MCNC: 77 MG/DL (ref 74–99)
HDLC SERPL-MCNC: 59.8 MG/DL
LDLC SERPL CALC-MCNC: 91 MG/DL
NON HDL CHOLESTEROL: 105 MG/DL (ref 0–149)
POTASSIUM SERPL-SCNC: 4.3 MMOL/L (ref 3.5–5.3)
PROT SERPL-MCNC: 6.5 G/DL (ref 6.4–8.2)
PSA SERPL-MCNC: 0.91 NG/ML
SODIUM SERPL-SCNC: 140 MMOL/L (ref 136–145)
TRIGL SERPL-MCNC: 69 MG/DL (ref 0–149)
TSH SERPL-ACNC: 1.63 MIU/L (ref 0.44–3.98)
VLDL: 14 MG/DL (ref 0–40)

## 2024-02-25 PROBLEM — Z12.5 PROSTATE CANCER SCREENING: Status: ACTIVE | Noted: 2024-02-25

## 2024-02-25 PROBLEM — Z00.00 ROUTINE GENERAL MEDICAL EXAMINATION AT HEALTH CARE FACILITY: Status: ACTIVE | Noted: 2024-02-25

## 2024-02-25 PROBLEM — Z00.00 ROUTINE GENERAL MEDICAL EXAMINATION AT A HEALTH CARE FACILITY: Status: ACTIVE | Noted: 2024-02-25

## 2024-02-25 ASSESSMENT — ENCOUNTER SYMPTOMS
CHILLS: 0
FEVER: 0
NEUROLOGICAL NEGATIVE: 1
CARDIOVASCULAR NEGATIVE: 1
MUSCULOSKELETAL NEGATIVE: 1
ALLERGIC/IMMUNOLOGIC NEGATIVE: 1
PSYCHIATRIC NEGATIVE: 1
NAUSEA: 0
GASTROINTESTINAL NEGATIVE: 1
RESPIRATORY NEGATIVE: 1
HEMATOLOGIC/LYMPHATIC NEGATIVE: 1
VOMITING: 0
ENDOCRINE NEGATIVE: 1

## 2024-02-26 ENCOUNTER — TELEPHONE (OUTPATIENT)
Dept: DERMATOLOGY | Facility: CLINIC | Age: 69
End: 2024-02-26
Payer: MEDICARE

## 2024-02-26 NOTE — TELEPHONE ENCOUNTER
Left detailed message for pt to call [place]  back to confirm appt rescheduled from 5/23 due to provider out of the office -ag 2/26/2024

## 2024-04-10 DIAGNOSIS — E78.5 HYPERLIPIDEMIA, UNSPECIFIED: ICD-10-CM

## 2024-04-10 RX ORDER — ROSUVASTATIN CALCIUM 10 MG/1
10 TABLET, COATED ORAL DAILY
Qty: 90 TABLET | Refills: 0 | Status: SHIPPED | OUTPATIENT
Start: 2024-04-10

## 2024-04-17 ENCOUNTER — OFFICE VISIT (OUTPATIENT)
Dept: OTOLARYNGOLOGY | Facility: CLINIC | Age: 69
End: 2024-04-17
Payer: MEDICARE

## 2024-04-17 VITALS
BODY MASS INDEX: 33.12 KG/M2 | TEMPERATURE: 98.5 F | SYSTOLIC BLOOD PRESSURE: 124 MMHG | HEIGHT: 67 IN | HEART RATE: 52 BPM | DIASTOLIC BLOOD PRESSURE: 80 MMHG | WEIGHT: 211 LBS | OXYGEN SATURATION: 98 % | RESPIRATION RATE: 16 BRPM

## 2024-04-17 DIAGNOSIS — T17.320D CHOKING DUE TO FOOD IN LARYNX, SUBSEQUENT ENCOUNTER: Primary | ICD-10-CM
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
## 2024-04-17 DIAGNOSIS — R49.0 DYSPHONIA: ICD-10-CM

## 2024-04-17 DIAGNOSIS — R13.10 DYSPHAGIA, UNSPECIFIED TYPE: ICD-10-CM

## 2024-04-17 PROCEDURE — 1160F RVW MEDS BY RX/DR IN RCRD: CPT | Performed by: STUDENT IN AN ORGANIZED HEALTH CARE EDUCATION/TRAINING PROGRAM

## 2024-04-17 PROCEDURE — 1126F AMNT PAIN NOTED NONE PRSNT: CPT | Performed by: STUDENT IN AN ORGANIZED HEALTH CARE EDUCATION/TRAINING PROGRAM

## 2024-04-17 PROCEDURE — 99213 OFFICE O/P EST LOW 20 MIN: CPT | Performed by: STUDENT IN AN ORGANIZED HEALTH CARE EDUCATION/TRAINING PROGRAM

## 2024-04-17 PROCEDURE — 1159F MED LIST DOCD IN RCRD: CPT | Performed by: STUDENT IN AN ORGANIZED HEALTH CARE EDUCATION/TRAINING PROGRAM

## 2024-04-17 PROCEDURE — 1123F ACP DISCUSS/DSCN MKR DOCD: CPT | Performed by: STUDENT IN AN ORGANIZED HEALTH CARE EDUCATION/TRAINING PROGRAM

## 2024-04-17 PROCEDURE — 31575 DIAGNOSTIC LARYNGOSCOPY: CPT | Performed by: STUDENT IN AN ORGANIZED HEALTH CARE EDUCATION/TRAINING PROGRAM

## 2024-04-17 PROCEDURE — 3008F BODY MASS INDEX DOCD: CPT | Performed by: STUDENT IN AN ORGANIZED HEALTH CARE EDUCATION/TRAINING PROGRAM

## 2024-04-17 RX ORDER — OMEPRAZOLE 40 MG/1
CAPSULE, DELAYED RELEASE ORAL
Qty: 30 CAPSULE | Refills: 1 | Status: SHIPPED | OUTPATIENT
Start: 2024-04-17 | End: 2024-05-09

## 2024-04-17 ASSESSMENT — LIFESTYLE VARIABLES
HOW OFTEN DO YOU HAVE SIX OR MORE DRINKS ON ONE OCCASION: NEVER
SKIP TO QUESTIONS 9-10: 1
AUDIT-C TOTAL SCORE: 0
HOW OFTEN DO YOU HAVE A DRINK CONTAINING ALCOHOL: NEVER
HOW MANY STANDARD DRINKS CONTAINING ALCOHOL DO YOU HAVE ON A TYPICAL DAY: PATIENT DOES NOT DRINK

## 2024-04-17 ASSESSMENT — ENCOUNTER SYMPTOMS
OCCASIONAL FEELINGS OF UNSTEADINESS: 0
LOSS OF SENSATION IN FEET: 0
DEPRESSION: 0

## 2024-04-17 ASSESSMENT — PAIN SCALES - GENERAL: PAINLEVEL: 0-NO PAIN

## 2024-04-17 NOTE — PROGRESS NOTES
"Subjective   Patient ID: Tyrone Ma is a 69 y.o. male who presents for trouble swallowing.    History 10/18/2023: Referred by Dr. Sol for obstructive sleep apnea and consideration of surgical intervention.    The patient recently started CPAP in May 2023. Using CPAP 4-5 hours a night. He has not appreciated much improvement in sleep quality. His PCP and sleep equipment company are currently managing his CPAP and mask optimization.  He has changed his mask somewhat and feels that this ihas improved his compliance. Notes occasional SOB which causes him to take off his mask.    Update 4/17/2024:  Recently had environmental allergies exacerbation starting March 20. Was associated with trouble with his swallowing; noting difficulty with swallowing. Has a gagging sensation and sensation of regurgitation. Has had only a few episodes of regurgitation. Finds that bread, salad, and meats are harder to get down. No nausea/bloating. Was seen at an urgent care and prescribed Augmentin for possible sinus infection; did not appreciate improvement. Has intermittently tried some Protonix. His voice has gotten \"gravelly.\" Never smoker.    Objective   Physical Exam  CONSTITUTIONAL: Well appearing male who appears stated age.  PSYCHIATRIC: Alert, appropriate mood and affect.  RESPIRATORY: Normal inspiration and expiration and chest wall expansion; no use of accessory muscles to breathe.  VOICE: Clear speech with moderate hoarseness. Rough, \"gravelly\" voicing. No stridor nor stertor. Very strong gag.  HEAD, FACE, AND SKIN: Symmetric facial feature.  ORAL CAVITY: Lips were without lesions. Moist mucous membranes. No lesions appreciated along the gingiva, oral mucosa, nor tongue.  DENTITION: Grossly normal without obvious infection nor inflammation. Moderate attrition.  OROPHARYNX: No lesion nor mucosal abnormality. The uvula was normal appearing. The tonsils were 2+. Prominent gag.  NECK: Visualization and palpation of the neck " revealed no mass lesions, no thyromegaly or thyroid masses. No cutaneous lesions appreciated. No pain with manipulation laryngeal complex.  LYMPHATICS (CERVICAL): There were no palpable lymph nodes in the posterior triangle, submandibular triangle, jugulodigastric region, nor central neck.    --------------------------------------------------  Procedure: Flexible Laryngoscopy - Diagnostic  Indication: dysphagia, dysphonia  Informed consent verbally obtained: The risks, benefits, alternatives, and expectations were discussed with the patient, who wished to proceed  Anesthesia: Topical lidocaine/oxymetazoline    Findings: After topical anesthetic was applied the flexible endoscope was advanced into the naris. The nasal cavity, nasopharynx, oropharynx, hypopharynx, and larynx were evaluated and were normal with the following significant findings or exceptions:    - Clear drainage seated in the nasopharynx  - No masses/lesions appreciated  - Normal and symmetric TVC motion bilaterally  - Good closure with possible mild glottal gap; difficult to assess secondary to supraglottic squeeze  - There is mild to moderate supraglottic/arytenoid and post-cricoid edema  - No pooling of secretions    There were no complications and the patient tolerated the procedure well.  --------------------------------------------------    Assessment/Plan   Diagnoses and all orders for this visit:  Choking due to food in larynx, subsequent encounter  -     FL modified barium swallow study; Future  Dysphagia, unspecified type  -     FL modified barium swallow study; Future  -     omeprazole (PriLOSEC) 40 mg DR capsule; Take 1 capsule by mouth once daily before breakfast. Do not crush or chew.  Dysphonia      Dysphagia, choking episodes, dysphonia  The patient reports that starting in March 2024 he began having increasing episodes of difficulty with swallowing.  These have been associated with change in voice.  On initial flexible laryngoscopy I  appreciate some clear drainage within the nasopharynx as well as generalized edema in the supraglottis and postcricoid areas.    I discussed my findings with the patient and offered reassurance and counseling. 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    I recommended the following therapeutic measures to address the issues noted above:  - We will start a short-term trial with a daily PPI. Instructions on how to take prior to meals given.  - MBS to better assess functional swallow.  - Follow up in 6-8 weeks  - We will discuss further options should medical management not succeed.    2.  Obstructive sleep apnea  The patient was previously diagnosed with severe obstructive sleep apnea.  I previously discussed with the patient his sleep study results as well as his physical exam in detail; reassurance was provided.  The patient has severe sleep apnea that is a mixture of obstructive and central events.  Given the patient's anatomy we discussed that he is a poor candidate for consideration of palato-pharyngeal surgery.  In addition, because of his multiple central apneas he would not be a good inspire candidate.  I recommended that given his sleep study results he continue CPAP and work with his company to continue to optimize his usage.     However, if after 6 to 12 months of CPAP use he is finding persistent difficulty it would not be unreasonable to see one of my sleep surgeon colleagues to discuss options to optimize his anatomy.  He may benefit from straightening of the septum and would also likely benefit from advancement of his mandible given his overbite.     This note was created using speech recognition transcription software. Despite proofreading, typographical errors may be present that affect the meaning of the content. Please contact my office with any questions.

## 2024-04-26 ENCOUNTER — HOSPITAL ENCOUNTER (OUTPATIENT)
Dept: RADIOLOGY | Facility: HOSPITAL | Age: 69
Discharge: HOME | End: 2024-04-26
Payer: MEDICARE

## 2024-04-26 DIAGNOSIS — T17.320D CHOKING DUE TO FOOD IN LARYNX, SUBSEQUENT ENCOUNTER: ICD-10-CM
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
## 2024-04-26 DIAGNOSIS — R13.10 DYSPHAGIA, UNSPECIFIED TYPE: ICD-10-CM

## 2024-04-26 PROCEDURE — 74230 X-RAY XM SWLNG FUNCJ C+: CPT | Performed by: RADIOLOGY

## 2024-04-26 PROCEDURE — 92611 MOTION FLUOROSCOPY/SWALLOW: CPT | Mod: GN | Performed by: SPEECH-LANGUAGE PATHOLOGIST

## 2024-04-26 PROCEDURE — 74230 X-RAY XM SWLNG FUNCJ C+: CPT

## 2024-04-26 PROCEDURE — 2500000001 HC RX 250 WO HCPCS SELF ADMINISTERED DRUGS (ALT 637 FOR MEDICARE OP): Performed by: STUDENT IN AN ORGANIZED HEALTH CARE EDUCATION/TRAINING PROGRAM

## 2024-04-26 RX ADMIN — BARIUM SULFATE 60 ML: 400 SUSPENSION ORAL at 16:25

## 2024-04-29 NOTE — PROCEDURES
"Speech-Language Pathology    OP Modified Barium Swallow Study     Patient Name: Tyrone Ma  MRN: [id number]  : 1955  Today's Date: 24        MBSS completed. Informed verbal consent obtained prior to completion of exam. Trials of thin, nectar thick, puree, soft-solids, and regular solids given, + barium tablet.   Radiology room #2 is down, therefore the c-arm was utilized for this study. Study was completed on , however d/t lengthy time for images to transfer, this report is written after review of images on 24. Unfortunately, it appears some images may not have saved/transferred. Pt is pleasant, appears anxious, voice is clear/audible, do not appreciate any gravelly or raspy voice in conversation.     Recommendations:  Ok to continue a regular textured diet and thin liquids.   General aspiration precautions, fully upright for meals/meds  Small bites, slow rate of PO intake d/t changes in swallow with aging.   Do not talk when chewing/swallowing.   Reflux precautions.   May benefit from GI consult.      Assessment/Impression:    Full detailed SLP/Radiologist Modified Barium Swallow study report can be found under Chart Review tab, Imaging tab and  titled \"FL Modified Barium Swallow Study\"      Mechanics of the swallow summary:  Oral phase: Intact  Pharyngeal phase: Intact  Esophageal phase: Possible ?slower moving barium through distal esophagus as noted on a-p view with puree.     SLP impressions with severity rating:   Intact oral and pharyngeal phases of the swallow.   Bolus moves safely and efficiently from oral cavity, through pharynx, and empties into the esophagus without obstruction or airway compromise.   No significant oral or pharyngeal residue after the swallow.   No laryngeal penetration.   No aspiration.     Rosenbek Scale  Thin Liquids (MBSS)   Rosenbek's Penetration Aspiration Scale, Thin Liquids (MBSS):  [1. NO ASPIRATION & NO PENETRATION - no aspiration, contrast does not " enter airway]  Nectar Thick Liquids (MBSS)   Rosenbek's Penetration Aspiration Scale, Nectar thick liquids (MBSS):  [1. NO ASPIRATION & NO PENETRATION - no aspiration, contrast does not enter airway]  Soft solids (MBSS)   Rosenbek's Penetration Aspiration Scale, soft solids (MBSS):  [1. NO ASPIRATION & NO PENETRATION - no aspiration, contrast does not enter airway]  Purees (MBSS)   Rosenbek's Penetration Aspiration Scale, Purees (MBSS):  [1. NO ASPIRATION & NO PENETRATION - no aspiration, contrast does not enter airway]  Solids (MBSS)   Rosenbek's Penetration Aspiration Scale, Solids (MBSS):  [1. NO ASPIRATION & NO PENETRATION - no aspiration, contrast does not enter airway]    Reason for referral: Pt has complaint of some difficulty swallowing lately, more-so with bread, meat, salad. He reported gagging sensation and sensation of regurg.   Patient hx: Environmental allergies exacerbation starting March 20, c/o gravelly voice, harder to swallow. Seen by ENT and had a flexible laryngoscope, referral for instrumental swallow study.     Respiratory status: Room air   Previous diet: Does not modify diet by texture, self monitors his swallow, chews thoroughly before swallowing.     Follow-up speech therapy recommended: No  Education provided: Pt verbally provided with the results and recommendations of this instrumental swallow study. Video images reviewed during the session with patient, so that he might have a better understanding of the flow of the bolus from oral cavity, through pharynx, and emptying into esophagus.     Repeat study/ dc plan: As needed

## 2024-04-30 DIAGNOSIS — R13.10 DYSPHAGIA, UNSPECIFIED TYPE: Primary | ICD-10-CM

## 2024-05-05 NOTE — PROGRESS NOTES
Subjective    Patient ID: Tyrone Ma is a 69 y.o. male.    Chief Complaint: No chief complaint on file.     Last Surgery: No surgery found  Last Surgery Date: No surgery found    HPI  The patient is a pleasant 68 year-old right hand dominant male presenting today for evaluation of their right shoulder. He is presenting today with complaints of shoulder pain that has been progressively worsening. He has had the pain for the last 3-4 years. He does not feel that the pain is terrible, but his wife says he complains about it often. He has not tried injections yet but he has done therapy. He did not have success with therapy. He notes that his left shoulder is beginning to hurt.      11/07/23  Tyrone returns to the clinic today for a repeat follow up visit regarding his right shoulder.     He explains today that the injections are helping. He is hoping to have a repeat done today.     02/06/24  Tyrone returns to the clinic today for a follow up visit regarding his right shoulder.     He reports that the injections are continuing to take the edge off his shoulder pain.     05/07/24  Tyrone returns to the clinic today for a repeat follow up visit regarding his right shoulder.     He reports that injections are still beneficial. He notices when his injection is about to wear off as he starts to hurt.      Past medical history, surgical history, social history, and family history were all reviewed and are as per the [place] patient health history questionnaire form that I signed and scanned into the chart today.    Objective   Patient is a well-developed, well-nourished male in no acute distress. Breathes with normal chest rises. Pupils are round and symmetric today. Awake, alert, and oriented x3.     Examination of the left shoulder today reveals the skin to be intact. There is no sign any atrophy lesions or abrasions. There is no pain to palpation of the bony prominences. Cervical lymphadenopathy examined, and this was  negative.   Patient had 5 out of 5 wrist flexors extension, thumb extension bilaterally. Sensation was intact to light touch to median, ulnar, radial axillary, and musculocutaneous nerves bilaterally. Positive radial pulse bilaterally. Provocative maneuvers on the left side today were negative.   Range of motion of the left shoulder revealed 0-170° of forward elevation. 0-45° of external rotation. Internal rotation was to T 12.      Examination of the right shoulder today reveals the skin to be intact. There is no sign any atrophy lesions or abrasions. There is no pain to palpation of the bony prominences. Cervical lymphadenopathy examined, and this was negative.   Patient had 5 out of 5 wrist flexors extension, thumb extension, bilaterally. Sensation was intact to light touch to median, ulnar, radial, axillary, and musculocutaneous nerves bilaterally. Positive radial pulse bilaterally. Provocative maneuvers on the right side today were negative.   Range of motion of the right shoulder revealed 0-130° of forward elevation, passively correctable to 17. 0-35° of external rotation. And internal rotation up to T 12. Negative modified belly press test      Image Results:    Patient ID: Tyrone Ma is a 69 y.o. male.    L Inj/Asp: R glenohumeral on 5/7/2024 3:30 PM  Indications: pain  Details: 20 G needle, anterior approach  Medications: 40 mg triamcinolone acetonide 40 mg/mL; 4 mL lidocaine 10 mg/mL (1 %)  Outcome: tolerated well, no immediate complications  Procedure, treatment alternatives, risks and benefits explained, specific risks discussed. Consent was given by the patient. Immediately prior to procedure a time out was called to verify the correct patient, procedure, equipment, support staff and site/side marked as required. Patient was prepped and draped in the usual sterile fashion.           Assessment/Plan   Encounter Diagnoses:  No diagnosis found.  Patient with endstage arthritis of the right shoulder      At this time we had a long discussion about the various options for shoulder arthritis.      1. Do nothing and continue activity modifications.   2. Consider cortisone injections into the glenohumeral joint. This would give pain relief that is temporary. This would not stop the progression of arthritis. For some patients, this injection can provide no relief at all, relief for 2 weeks, 4 weeks, 3 months or longer. The risk of the injection is fairly minimal but does include included a very small chance of infection.   3. Option is a total shoulder replacement. This will give the patient a more permanent solution to pain relief. It would also improve function. There is no rush to this procedure it is an elective procedure.     We did a repeat injection today as he is getting some relief from them still.  He tolerated this well. We will continue with injections until he is ready to consider a shoulder replacement. We will obtain x-rays of his shoulder at his next visit.     No orders of the defined types were placed in this encounter.    Follow up in 3 months    Scribe Attestation  By signing my name below, IErica Scribe   attest that this documentation has been prepared under the direction and in the presence of Victor Hugo Somers MD.

## 2024-05-07 ENCOUNTER — OFFICE VISIT (OUTPATIENT)
Dept: GASTROENTEROLOGY | Facility: CLINIC | Age: 69
End: 2024-05-07
Payer: MEDICARE

## 2024-05-07 ENCOUNTER — OFFICE VISIT (OUTPATIENT)
Dept: ORTHOPEDIC SURGERY | Facility: HOSPITAL | Age: 69
End: 2024-05-07
Payer: MEDICARE

## 2024-05-07 VITALS — BODY MASS INDEX: 30.92 KG/M2 | WEIGHT: 204 LBS | HEIGHT: 68 IN

## 2024-05-07 DIAGNOSIS — M25.511 CHRONIC RIGHT SHOULDER PAIN: Primary | ICD-10-CM

## 2024-05-07 DIAGNOSIS — R13.19 ESOPHAGEAL DYSPHAGIA: Primary | ICD-10-CM

## 2024-05-07 DIAGNOSIS — G89.29 CHRONIC RIGHT SHOULDER PAIN: Primary | ICD-10-CM

## 2024-05-07 PROCEDURE — 99213 OFFICE O/P EST LOW 20 MIN: CPT | Performed by: ORTHOPAEDIC SURGERY

## 2024-05-07 PROCEDURE — 2500000004 HC RX 250 GENERAL PHARMACY W/ HCPCS (ALT 636 FOR OP/ED): Performed by: ORTHOPAEDIC SURGERY

## 2024-05-07 PROCEDURE — 3008F BODY MASS INDEX DOCD: CPT | Performed by: NURSE PRACTITIONER

## 2024-05-07 PROCEDURE — 1159F MED LIST DOCD IN RCRD: CPT | Performed by: ORTHOPAEDIC SURGERY

## 2024-05-07 PROCEDURE — 1123F ACP DISCUSS/DSCN MKR DOCD: CPT | Performed by: NURSE PRACTITIONER

## 2024-05-07 PROCEDURE — 3008F BODY MASS INDEX DOCD: CPT | Performed by: ORTHOPAEDIC SURGERY

## 2024-05-07 PROCEDURE — 1160F RVW MEDS BY RX/DR IN RCRD: CPT | Performed by: NURSE PRACTITIONER

## 2024-05-07 PROCEDURE — 20610 DRAIN/INJ JOINT/BURSA W/O US: CPT | Mod: RT | Performed by: ORTHOPAEDIC SURGERY

## 2024-05-07 PROCEDURE — 1160F RVW MEDS BY RX/DR IN RCRD: CPT | Performed by: ORTHOPAEDIC SURGERY

## 2024-05-07 PROCEDURE — 1123F ACP DISCUSS/DSCN MKR DOCD: CPT | Performed by: ORTHOPAEDIC SURGERY

## 2024-05-07 PROCEDURE — 1125F AMNT PAIN NOTED PAIN PRSNT: CPT | Performed by: ORTHOPAEDIC SURGERY

## 2024-05-07 PROCEDURE — 2500000005 HC RX 250 GENERAL PHARMACY W/O HCPCS: Performed by: ORTHOPAEDIC SURGERY

## 2024-05-07 PROCEDURE — 1159F MED LIST DOCD IN RCRD: CPT | Performed by: NURSE PRACTITIONER

## 2024-05-07 PROCEDURE — 99203 OFFICE O/P NEW LOW 30 MIN: CPT | Performed by: NURSE PRACTITIONER

## 2024-05-07 RX ADMIN — TRIAMCINOLONE ACETONIDE 40 MG: 400 INJECTION, SUSPENSION INTRA-ARTICULAR; INTRAMUSCULAR at 15:30

## 2024-05-07 RX ADMIN — LIDOCAINE HYDROCHLORIDE 4 ML: 10 INJECTION, SOLUTION INFILTRATION; PERINEURAL at 15:30

## 2024-05-07 ASSESSMENT — ENCOUNTER SYMPTOMS
WHEEZING: 0
DIFFICULTY URINATING: 0
MUSCULOSKELETAL NEGATIVE: 1
STRIDOR: 0
FATIGUE: 0
PSYCHIATRIC NEGATIVE: 1
APNEA: 0
ENDOCRINE NEGATIVE: 1
HEMATOLOGIC/LYMPHATIC NEGATIVE: 1
DIAPHORESIS: 0
RESPIRATORY NEGATIVE: 1
ALLERGIC/IMMUNOLOGIC NEGATIVE: 1
CHILLS: 0
SHORTNESS OF BREATH: 0
EYES NEGATIVE: 1
FEVER: 0
COUGH: 0
CHEST TIGHTNESS: 0
ROS GI COMMENTS: SEE HPI
NEUROLOGICAL NEGATIVE: 1
CARDIOVASCULAR NEGATIVE: 1

## 2024-05-07 ASSESSMENT — PAIN SCALES - GENERAL: PAINLEVEL_OUTOF10: 6

## 2024-05-07 ASSESSMENT — PAIN - FUNCTIONAL ASSESSMENT: PAIN_FUNCTIONAL_ASSESSMENT: 0-10

## 2024-05-07 NOTE — PROGRESS NOTES
Subjective   Patient ID: Tyrone Ma is a 69 y.o. male who presents for Dysphagia. PMH: HLD, obesity, ENRIQUE (CPAP)    In march had seasonal allergies. He was having trouble swallowing. Seen in [facility] and started Augmentin, seen by ENT and started on PPI  He underwent a barium swallow     In March this started and he never had dysphagia prior. He reports this is occurring daily in the afternoon with lunch or dinner. Worse with chicken, steaks, salads, he vomits or coughs them back. This is localized to cervical area.     Had scope with ENT    Does not occur with liquid.     He has never had an EGD    He is using 40 mg daily of Omeprazole, he denies heartburn, black tarry stools, rectal bleeding, abdominal pain, +weight loss  (approximately 5-7 lbs)    Was on Ozempic    Colonoscopy 2021-2 transverse colon polyps -TA    Family Hx: Denies a family hx of CRC, GI cancers, Davalos's     Review of Systems   Constitutional:  Negative for chills, diaphoresis, fatigue and fever.   HENT: Negative.     Eyes: Negative.    Respiratory: Negative.  Negative for apnea, cough, chest tightness, shortness of breath, wheezing and stridor.    Cardiovascular: Negative.    Gastrointestinal:         See HPI    Endocrine: Negative.    Genitourinary: Negative.  Negative for difficulty urinating.   Musculoskeletal: Negative.    Skin: Negative.    Allergic/Immunologic: Negative.    Neurological: Negative.    Hematological: Negative.    Psychiatric/Behavioral: Negative.         Objective   Physical Exam  Neurological:      Mental Status: He is alert.         Assessment/Plan   Diagnoses and all orders for this visit:  Esophageal dysphagia  -     Esophagogastroduodenoscopy (EGD); Future       68 yo male with  h/o sleep apnea (on CPAP), HLD who requests a VV today for dysphagia. Ongoing since March. Initially thought it was 2/2 allergies. He was seen by ENT and underwent a barium swallow. Swallow test showed possible slower moving barium through  distal esophagus. He is using a PPI without relief. He will complete EGD to r/o stricture, narrowing. He will follow-up after.     TE Hernandez-CNP 05/07/24 1:45 PM

## 2024-05-09 DIAGNOSIS — R13.10 DYSPHAGIA, UNSPECIFIED TYPE: ICD-10-CM

## 2024-05-09 RX ORDER — OMEPRAZOLE 40 MG/1
CAPSULE, DELAYED RELEASE ORAL
Qty: 90 CAPSULE | Refills: 1 | Status: SHIPPED | OUTPATIENT
Start: 2024-05-09

## 2024-05-20 RX ORDER — TRIAMCINOLONE ACETONIDE 40 MG/ML
40 INJECTION, SUSPENSION INTRA-ARTICULAR; INTRAMUSCULAR
Status: COMPLETED | OUTPATIENT
Start: 2024-05-07 | End: 2024-05-07

## 2024-05-20 RX ORDER — LIDOCAINE HYDROCHLORIDE 10 MG/ML
4 INJECTION INFILTRATION; PERINEURAL
Status: COMPLETED | OUTPATIENT
Start: 2024-05-07 | End: 2024-05-07

## 2024-05-21 NOTE — PROGRESS NOTES
Subjective     Tyrone Ma is a 69 y.o. male who presents for the following: Skin Check.  He notes a raised, scaly bump on his left abdomen, which has been present for several months.  It has been itching recently, but it has not changed in any other way, including in size, shape, or color, and it does not hurt or bleed.  He also notes recent pimple breakouts on his back.  He denies any other new, changing, or concerning skin lesions since his last visit; no bleeding, itching, or burning lesions.      Review of Systems:  No other skin or systemic complaints other than what is documented elsewhere in the note.    The following portions of the chart were reviewed this encounter and updated as appropriate:       Skin Cancer History  No skin cancer on file.    Specialty Problems          Dermatology Problems    Pigmented skin lesion       Past Dermatologic / Past Relevant Medical History:    - history of Aks  - epidermal acanthosis, increased basilar melanin pigmentation, and occasional moderately atypical junctional melanocytes on right proximal dorsal forearm diagnosed on 6/23/21 s/p wide local excision with 5-mm margins by Dr. Landers on 9/8/21  - no h/o atypical nevi or skin cancer     Family History:    No family history of melanoma or skin cancer    Social History:    The patient is retired from working in delivery for [employer] and is a [facility] fan    Allergies:  Patient has no known allergies.    Current Medications / CAM's:    Current Outpatient Medications:     ketoconazole (NIZOral) 2 % cream, As directed, Disp: , Rfl:     omeprazole (PriLOSEC) 40 mg DR capsule, TAKE 1 CAPSULE BY MOUTH ONCE DAILY BEFORE BREAKFAST. DO NOT CRUSH OR CHEW., Disp: 90 capsule, Rfl: 1    rosuvastatin (Crestor) 10 mg tablet, TAKE 1 TABLET BY MOUTH EVERY DAY, Disp: 90 tablet, Rfl: 0    sildenafil (Viagra) 100 mg tablet, TAKE 1 TABLET DAILY 1 HOUR BEFORE NEEDED, Disp: , Rfl:     clindamycin (Cleocin T) 1 % lotion, Apply  topically 2 times a day., Disp: 60 mL, Rfl: 11     Objective   Well appearing patient in no apparent distress; mood and affect are within normal limits.    A full examination was performed including scalp, face, eyes, ears, nose, lips, neck, chest, axillae, abdomen, back, bilateral upper extremities, and bilateral lower extremities. All findings within normal limits unless otherwise noted below.    Assessment/Plan   1. Inflamed seborrheic keratosis  Left Abdomen (side) - Upper  On the patient's left upper abdomen, there is a 1 cm erythematous and light brown-colored, hyperkeratotic, stuck-on appearing papule with a surrounding rim of erythema    Inflamed Seborrheic Keratosis - left upper abdomen.  The benign nature of this lesion was discussed with the patient today and reassurance provided.  Given the history the patient provides of frequent irritation and associated symptoms as well as its inflamed appearance on exam today, I offered to treat this lesion with liquid nitrogen cryotherapy.  The patient expressed understanding, is in agreement with this plan, and wishes to proceed with cryotherapy today.    Destr of lesion - Left Abdomen (side) - Upper  Complexity: simple    Destruction method: cryotherapy    Informed consent: discussed and consent obtained    Lesion destroyed using liquid nitrogen: Yes    Cryotherapy cycles:  2  Outcome: patient tolerated procedure well with no complications    Post-procedure details: wound care instructions given      2. Actinic keratosis (17)  Head - Anterior (Face) (17)  Scattered on the patient's face and scalp, there are multiple erythematous, gritty, scaly macules     Actinic Keratoses -scattered on face and scalp.  The pre-cancerous nature of these lesions and treatment options were discussed with the patient today.  At this time, I recommend treatment with liquid nitrogen cryotherapy.  The patient expressed understanding, is in agreement with this plan, and wishes to proceed  with cryotherapy today.    Destr of lesion - Head - Anterior (Face)  Complexity: simple    Destruction method: cryotherapy    Informed consent: discussed and consent obtained    Lesion destroyed using liquid nitrogen: Yes    Cryotherapy cycles:  1  Outcome: patient tolerated procedure well with no complications    Post-procedure details: wound care instructions given      3. Melanocytic nevus of trunk  Scattered on the patient's face, neck, trunk, and extremities, there are several small, round- to oval-shaped, brown-pigmented and pink-colored, symmetric, uniform-appearing macules and dome-shaped papules    Clinically benign- to slightly atypical-appearing nevi - the clinically benign- to slightly atypical-appearing nature of the patient's nevi was discussed with the patient today.  None of the patient's nevi meet threshold for biopsy today.  I emphasized the importance of performing monthly self-skin exams using the ABCDs of monitoring moles, which were reviewed with the patient today and an informational hand-out provided.  I also emphasized the importance of sun avoidance and sun protection with daily sunscreen use.  The patient expressed understanding and is in agreement with this plan.    4. Seborrheic keratosis  Scattered on the patient's face, neck, trunk, and extremities, there are multiple tan- to light brown-colored, hyperkeratotic, stuck-on appearing papules of varying size and shape    Seborrheic Keratoses - the benign nature of these lesions was discussed with the patient today and reassurance provided.  No treatment is medically indicated for the noninflamed SKs at this time.    5. Lentigo  Photodistributed  Multiple tan- to light brown-colored, round- to oval-shaped, symmetric and uniform-appearing macules and small patches consistent with lentigines scattered in sun-exposed areas.    Solar Lentigines and photodamage.  The clinically benign-appearing nature of these lesions and their relation to  chronic sun exposure were discussed with the patient today and reassurance provided.  None of these lesions meet threshold for biopsy today, and thus no treatment is medically indicated for these lesions at this time.  The signs and symptoms of skin cancer were reviewed and the patient was advised to practice sun protection and sun avoidance, use daily sunscreen, and perform regular self skin exams.  The patient was instructed to monitor these lesions for any changes, such as in size, shape, or color, or associated symptoms and to call our office to schedule a return visit for re-evaluation if any such changes or symptoms are noticed in the future.  The patient expressed understanding and is in agreement with this plan.    6. Folliculitis  Left Upper Back  Scattered on the patient's back, there are several follicular-based erythematous, inflammatory papules and pustules    Folliculitis - back.  The bacterial nature of this condition and treatment options were discussed with the patient today.  At this time, I recommend topical antibiotic therapy with Clindamycin 1% lotion, which the patient was instructed to apply twice daily to the affected areas or up to 3-4 times per day as needed for active lesions.  The risks, benefits, and side effects of this medication were discussed.  The patient expressed understanding and is in agreement with this plan.    clindamycin (Cleocin T) 1 % lotion - Left Upper Back  Apply topically 2 times a day.    7. History of dysplastic nevus  On the patient's right proximal dorsal forearm, there is a well-healed scar with no evidence of recurrent growth or pigmentation on exam today.  There is evidence of photodamage in sun-exposed areas.    History of actinic keratoses and photodamage.  There is no evidence of recurrence on exam today.  I emphasized the importance of continuing to perform monthly self-skin exams using the ABCDs of monitoring moles, which were reviewed with the patient, as  well as the importance of sun avoidance and sun protection with daily sunscreen use.  I will have the patient return to our office in 6 to 12 months for routine follow-up and skin exam, and the patient was instructed to call our office should the patient notice any new, changing, symptomatic, or otherwise concerning skin lesions before then.  The patient expressed understanding and is in agreement with this plan.    8. Diffuse photodamage of skin  Photodistributed  Diffuse photodamage with actinic changes with telangiectasia and mottled pigmentation in sun-exposed areas.    Photodamage.  The signs and symptoms of skin cancer were reviewed and the patient was advised to practice sun protection and sun avoidance, use daily sunscreen, and perform regular self skin exams.  Sun protection was discussed, including avoiding the mid-day sun, wearing a sunscreen with SPF at least 50, and stressing the need for reapplication of sunscreen and applying more than they think they need.

## 2024-05-23 ASSESSMENT — DERMATOLOGY QUALITY OF LIFE (QOL) ASSESSMENT
RATE HOW BOTHERED YOU ARE BY EFFECTS OF YOUR SKIN PROBLEMS ON YOUR ACTIVITIES (EG, GOING OUT, ACCOMPLISHING WHAT YOU WANT, WORK ACTIVITIES OR YOUR RELATIONSHIPS WITH OTHERS): 0 - NEVER BOTHERED
DATE THE QUALITY-OF-LIFE ASSESSMENT WAS COMPLETED: 66983
RATE HOW BOTHERED YOU ARE BY EFFECTS OF YOUR SKIN PROBLEMS ON YOUR ACTIVITIES (EG, GOING OUT, ACCOMPLISHING WHAT YOU WANT, WORK ACTIVITIES OR YOUR RELATIONSHIPS WITH OTHERS): 0 - NEVER BOTHERED
RATE HOW EMOTIONALLY BOTHERED YOU ARE BY YOUR SKIN PROBLEM (FOR EXAMPLE, WORRY, EMBARRASSMENT, FRUSTRATION): 0 - NEVER BOTHERED
WHAT SINGLE SKIN CONDITION LISTED BELOW IS THE PATIENT ANSWERING THE QUALITY-OF-LIFE ASSESSMENT QUESTIONS ABOUT: NONE OF THE ABOVE
RATE HOW BOTHERED YOU ARE BY SYMPTOMS OF YOUR SKIN PROBLEM (EG, ITCHING, STINGING BURNING, HURTING OR SKIN IRRITATION): 0 - NEVER BOTHERED
RATE HOW EMOTIONALLY BOTHERED YOU ARE BY YOUR SKIN PROBLEM (FOR EXAMPLE, WORRY, EMBARRASSMENT, FRUSTRATION): 0 - NEVER BOTHERED
RATE HOW BOTHERED YOU ARE BY SYMPTOMS OF YOUR SKIN PROBLEM (EG, ITCHING, STINGING BURNING, HURTING OR SKIN IRRITATION): 0 - NEVER BOTHERED
WHAT SINGLE SKIN CONDITION LISTED BELOW IS THE PATIENT ANSWERING THE QUALITY-OF-LIFE ASSESSMENT QUESTIONS ABOUT: NONE OF THE ABOVE

## 2024-05-23 ASSESSMENT — PATIENT GLOBAL ASSESSMENT (PGA): WHAT IS THE PGA: PATIENT GLOBAL ASSESSMENT:  1 - CLEAR

## 2024-05-24 ENCOUNTER — OFFICE VISIT (OUTPATIENT)
Dept: DERMATOLOGY | Facility: CLINIC | Age: 69
End: 2024-05-24
Payer: MEDICARE

## 2024-05-24 DIAGNOSIS — L57.0 ACTINIC KERATOSIS: ICD-10-CM

## 2024-05-24 DIAGNOSIS — L57.8 DIFFUSE PHOTODAMAGE OF SKIN: ICD-10-CM

## 2024-05-24 DIAGNOSIS — L82.0 INFLAMED SEBORRHEIC KERATOSIS: Primary | ICD-10-CM

## 2024-05-24 DIAGNOSIS — L73.9 FOLLICULITIS: ICD-10-CM

## 2024-05-24 DIAGNOSIS — L82.1 SEBORRHEIC KERATOSIS: ICD-10-CM

## 2024-05-24 DIAGNOSIS — L81.4 LENTIGO: ICD-10-CM

## 2024-05-24 DIAGNOSIS — D22.5 MELANOCYTIC NEVUS OF TRUNK: ICD-10-CM

## 2024-05-24 DIAGNOSIS — Z86.018 HISTORY OF DYSPLASTIC NEVUS: ICD-10-CM

## 2024-05-24 PROCEDURE — 17110 DESTRUCTION B9 LES UP TO 14: CPT | Performed by: DERMATOLOGY

## 2024-05-24 PROCEDURE — 1123F ACP DISCUSS/DSCN MKR DOCD: CPT | Performed by: DERMATOLOGY

## 2024-05-24 PROCEDURE — 1159F MED LIST DOCD IN RCRD: CPT | Performed by: DERMATOLOGY

## 2024-05-24 PROCEDURE — 1160F RVW MEDS BY RX/DR IN RCRD: CPT | Performed by: DERMATOLOGY

## 2024-05-24 PROCEDURE — 3008F BODY MASS INDEX DOCD: CPT | Performed by: DERMATOLOGY

## 2024-05-24 PROCEDURE — 17004 DESTROY PREMAL LESIONS 15/>: CPT | Performed by: DERMATOLOGY

## 2024-05-24 PROCEDURE — 99214 OFFICE O/P EST MOD 30 MIN: CPT | Performed by: DERMATOLOGY

## 2024-05-24 RX ORDER — CLINDAMYCIN PHOSPHATE 10 UG/ML
LOTION TOPICAL 2 TIMES DAILY
Qty: 60 ML | Refills: 11 | Status: SHIPPED | OUTPATIENT
Start: 2024-05-24 | End: 2025-05-24

## 2024-06-07 ENCOUNTER — APPOINTMENT (OUTPATIENT)
Dept: GASTROENTEROLOGY | Facility: CLINIC | Age: 69
End: 2024-06-07
Payer: MEDICARE

## 2024-06-18 ENCOUNTER — ANESTHESIA EVENT (OUTPATIENT)
Dept: GASTROENTEROLOGY | Facility: HOSPITAL | Age: 69
End: 2024-06-18
Payer: MEDICARE

## 2024-06-18 ENCOUNTER — ANESTHESIA (OUTPATIENT)
Dept: GASTROENTEROLOGY | Facility: HOSPITAL | Age: 69
End: 2024-06-18
Payer: MEDICARE

## 2024-06-18 ENCOUNTER — HOSPITAL ENCOUNTER (OUTPATIENT)
Dept: GASTROENTEROLOGY | Facility: HOSPITAL | Age: 69
Setting detail: OUTPATIENT SURGERY
Discharge: HOME | End: 2024-06-18
Payer: MEDICARE

## 2024-06-18 VITALS
DIASTOLIC BLOOD PRESSURE: 69 MMHG | RESPIRATION RATE: 19 BRPM | HEIGHT: 69 IN | TEMPERATURE: 98.4 F | WEIGHT: 195.33 LBS | HEART RATE: 50 BPM | BODY MASS INDEX: 28.93 KG/M2 | OXYGEN SATURATION: 98 % | SYSTOLIC BLOOD PRESSURE: 128 MMHG

## 2024-06-18 DIAGNOSIS — R13.19 ESOPHAGEAL DYSPHAGIA: Primary | ICD-10-CM

## 2024-06-18 PROCEDURE — 7100000009 HC PHASE TWO TIME - INITIAL BASE CHARGE

## 2024-06-18 PROCEDURE — 7100000010 HC PHASE TWO TIME - EACH INCREMENTAL 1 MINUTE

## 2024-06-18 PROCEDURE — 2500000004 HC RX 250 GENERAL PHARMACY W/ HCPCS (ALT 636 FOR OP/ED): Performed by: NURSE ANESTHETIST, CERTIFIED REGISTERED

## 2024-06-18 PROCEDURE — 2720000007 HC OR 272 NO HCPCS

## 2024-06-18 PROCEDURE — 7100000002 HC RECOVERY ROOM TIME - EACH INCREMENTAL 1 MINUTE

## 2024-06-18 PROCEDURE — 3700000002 HC GENERAL ANESTHESIA TIME - EACH INCREMENTAL 1 MINUTE

## 2024-06-18 PROCEDURE — 3700000001 HC GENERAL ANESTHESIA TIME - INITIAL BASE CHARGE

## 2024-06-18 PROCEDURE — 7100000001 HC RECOVERY ROOM TIME - INITIAL BASE CHARGE

## 2024-06-18 PROCEDURE — 43239 EGD BIOPSY SINGLE/MULTIPLE: CPT | Performed by: INTERNAL MEDICINE

## 2024-06-18 PROCEDURE — 2500000005 HC RX 250 GENERAL PHARMACY W/O HCPCS: Performed by: NURSE ANESTHETIST, CERTIFIED REGISTERED

## 2024-06-18 RX ORDER — LIDOCAINE HYDROCHLORIDE 20 MG/ML
INJECTION, SOLUTION EPIDURAL; INFILTRATION; INTRACAUDAL; PERINEURAL AS NEEDED
Status: DISCONTINUED | OUTPATIENT
Start: 2024-06-18 | End: 2024-06-18

## 2024-06-18 RX ORDER — SODIUM CHLORIDE, SODIUM LACTATE, POTASSIUM CHLORIDE, CALCIUM CHLORIDE 600; 310; 30; 20 MG/100ML; MG/100ML; MG/100ML; MG/100ML
INJECTION, SOLUTION INTRAVENOUS CONTINUOUS PRN
Status: DISCONTINUED | OUTPATIENT
Start: 2024-06-18 | End: 2024-06-18

## 2024-06-18 RX ORDER — SODIUM CHLORIDE, SODIUM LACTATE, POTASSIUM CHLORIDE, CALCIUM CHLORIDE 600; 310; 30; 20 MG/100ML; MG/100ML; MG/100ML; MG/100ML
20 INJECTION, SOLUTION INTRAVENOUS CONTINUOUS
Status: DISCONTINUED | OUTPATIENT
Start: 2024-06-18 | End: 2024-06-19 | Stop reason: HOSPADM

## 2024-06-18 RX ORDER — PROPOFOL 10 MG/ML
INJECTION, EMULSION INTRAVENOUS AS NEEDED
Status: DISCONTINUED | OUTPATIENT
Start: 2024-06-18 | End: 2024-06-18

## 2024-06-18 SDOH — HEALTH STABILITY: PHYSICAL HEALTH: ON AVERAGE, HOW MANY MINUTES DO YOU ENGAGE IN EXERCISE AT THIS LEVEL?: PATIENT DECLINED

## 2024-06-18 SDOH — ECONOMIC STABILITY: HOUSING INSECURITY: IN THE LAST 12 MONTHS, HOW MANY PLACES HAVE YOU LIVED?: 1

## 2024-06-18 SDOH — ECONOMIC STABILITY: INCOME INSECURITY: IN THE LAST 12 MONTHS, WAS THERE A TIME WHEN YOU WERE NOT ABLE TO PAY THE MORTGAGE OR RENT ON TIME?: NO

## 2024-06-18 SDOH — HEALTH STABILITY: PHYSICAL HEALTH
ON AVERAGE, HOW MANY DAYS PER WEEK DO YOU ENGAGE IN MODERATE TO STRENUOUS EXERCISE (LIKE A BRISK WALK)?: PATIENT DECLINED

## 2024-06-18 SDOH — ECONOMIC STABILITY: TRANSPORTATION INSECURITY
IN THE PAST 12 MONTHS, HAS THE LACK OF TRANSPORTATION KEPT YOU FROM MEDICAL APPOINTMENTS OR FROM GETTING MEDICATIONS?: NO

## 2024-06-18 SDOH — ECONOMIC STABILITY: GENERAL
WHICH OF THE FOLLOWING WOULD YOU LIKE TO GET CONNECTED TO IN ORDER TO RECEIVE A DISCOUNT OR FOR FREE? (CHOOSE ALL THAT APPLY): COMPUTER;BROADBAND INTERNET SUBSCRIPTION;DIGITAL/INTERNET SKILLS TRAINING

## 2024-06-18 SDOH — ECONOMIC STABILITY: GENERAL
WHICH OF THE FOLLOWING DO YOU KNOW HOW TO USE AND HAVE ACCESS TO EVERY DAY? (CHOOSE ALL THAT APPLY): DESKTOP COMPUTER, LAPTOP COMPUTER, OR TABLET WITH BROADBAND INTERNET CONNECTION;SMARTPHONE WITH CELLULAR DATA PLAN

## 2024-06-18 SDOH — ECONOMIC STABILITY: HOUSING INSECURITY
IN THE LAST 12 MONTHS, WAS THERE A TIME WHEN YOU DID NOT HAVE A STEADY PLACE TO SLEEP OR SLEPT IN A SHELTER (INCLUDING NOW)?: NO

## 2024-06-18 SDOH — ECONOMIC STABILITY: TRANSPORTATION INSECURITY
IN THE PAST 12 MONTHS, HAS LACK OF TRANSPORTATION KEPT YOU FROM MEETINGS, WORK, OR FROM GETTING THINGS NEEDED FOR DAILY LIVING?: NO

## 2024-06-18 ASSESSMENT — PAIN - FUNCTIONAL ASSESSMENT
PAIN_FUNCTIONAL_ASSESSMENT: 0-10
PAIN_FUNCTIONAL_ASSESSMENT: UNABLE TO SELF-REPORT
PAIN_FUNCTIONAL_ASSESSMENT: 0-10
PAIN_FUNCTIONAL_ASSESSMENT: 0-10
PAIN_FUNCTIONAL_ASSESSMENT: UNABLE TO SELF-REPORT
PAIN_FUNCTIONAL_ASSESSMENT: 0-10
PAIN_FUNCTIONAL_ASSESSMENT: 0-10

## 2024-06-18 ASSESSMENT — PATIENT HEALTH QUESTIONNAIRE - PHQ9
SUM OF ALL RESPONSES TO PHQ9 QUESTIONS 1 AND 2: 0
2. FEELING DOWN, DEPRESSED OR HOPELESS: NOT AT ALL
1. LITTLE INTEREST OR PLEASURE IN DOING THINGS: NOT AT ALL

## 2024-06-18 ASSESSMENT — SOCIAL DETERMINANTS OF HEALTH (SDOH)
HOW OFTEN DO YOU ATTENT MEETINGS OF THE CLUB OR ORGANIZATION YOU BELONG TO?: PATIENT DECLINED
HOW OFTEN DO YOU GET TOGETHER WITH FRIENDS OR RELATIVES?: MORE THAN THREE TIMES A WEEK
WITHIN THE LAST YEAR, HAVE YOU BEEN AFRAID OF YOUR PARTNER OR EX-PARTNER?: NO
WITHIN THE LAST YEAR, HAVE TO BEEN RAPED OR FORCED TO HAVE ANY KIND OF SEXUAL ACTIVITY BY YOUR PARTNER OR EX-PARTNER?: NO
IN A TYPICAL WEEK, HOW MANY TIMES DO YOU TALK ON THE PHONE WITH FAMILY, FRIENDS, OR NEIGHBORS?: MORE THAN THREE TIMES A WEEK
HOW OFTEN DO YOU ATTEND CHURCH OR RELIGIOUS SERVICES?: PATIENT DECLINED
DO YOU BELONG TO ANY CLUBS OR ORGANIZATIONS SUCH AS CHURCH GROUPS UNIONS, FRATERNAL OR ATHLETIC GROUPS, OR SCHOOL GROUPS?: PATIENT DECLINED
HOW HARD IS IT FOR YOU TO PAY FOR THE VERY BASICS LIKE FOOD, HOUSING, MEDICAL CARE, AND HEATING?: NOT HARD AT ALL
WITHIN THE LAST YEAR, HAVE YOU BEEN HUMILIATED OR EMOTIONALLY ABUSED IN OTHER WAYS BY YOUR PARTNER OR EX-PARTNER?: NO
WITHIN THE LAST YEAR, HAVE YOU BEEN KICKED, HIT, SLAPPED, OR OTHERWISE PHYSICALLY HURT BY YOUR PARTNER OR EX-PARTNER?: NO
IN THE PAST 12 MONTHS, HAS THE ELECTRIC, GAS, OIL, OR WATER COMPANY THREATENED TO SHUT OFF SERVICE IN YOUR HOME?: NO

## 2024-06-18 ASSESSMENT — ENCOUNTER SYMPTOMS
DEPRESSION: 0
OCCASIONAL FEELINGS OF UNSTEADINESS: 0
LOSS OF SENSATION IN FEET: 0

## 2024-06-18 ASSESSMENT — COLUMBIA-SUICIDE SEVERITY RATING SCALE - C-SSRS
6. HAVE YOU EVER DONE ANYTHING, STARTED TO DO ANYTHING, OR PREPARED TO DO ANYTHING TO END YOUR LIFE?: NO
1. IN THE PAST MONTH, HAVE YOU WISHED YOU WERE DEAD OR WISHED YOU COULD GO TO SLEEP AND NOT WAKE UP?: NO
2. HAVE YOU ACTUALLY HAD ANY THOUGHTS OF KILLING YOURSELF?: NO

## 2024-06-18 ASSESSMENT — PAIN SCALES - GENERAL
PAINLEVEL_OUTOF10: 0 - NO PAIN

## 2024-06-18 NOTE — H&P
"History Of Present Illness  Tyrone Ma is a 69 y.o. male presenting with dysphagia.     Past Medical History  Past Medical History:   Diagnosis Date    GERD (gastroesophageal reflux disease)     HL (hearing loss)     ENRIQUE (obstructive sleep apnea)      Surgical History  Past Surgical History:   Procedure Laterality Date    COLONOSCOPY      LASIK       Social History  He reports that he has never smoked. He has never used smokeless tobacco. He reports that he does not drink alcohol and does not use drugs.    Family History  Family History   Problem Relation Name Age of Onset    Heart attack Mother Grace     Stomach cancer Mother Grace     Hypertension Father Tedted     Heart failure Father Tedted         Allergies  No Known Allergies  Review of Systems     Physical Exam  Vitals and nursing note reviewed.   Constitutional:       Appearance: Normal appearance.   Cardiovascular:      Rate and Rhythm: Normal rate and regular rhythm.      Pulses: Normal pulses.      Heart sounds: Normal heart sounds.   Pulmonary:      Effort: Pulmonary effort is normal.      Breath sounds: Normal breath sounds.   Abdominal:      General: Abdomen is flat.      Palpations: Abdomen is soft.   Neurological:      Mental Status: He is alert.          Last Recorded Vitals  Blood pressure 144/76, pulse 59, temperature 36.6 °C (97.9 °F), temperature source Temporal, resp. rate 16, height 1.753 m (5' 9\"), weight 88.6 kg (195 lb 5.2 oz), SpO2 96%.    Assessment/Plan   dysphagia     Sorin Hui MD  "

## 2024-06-18 NOTE — ANESTHESIA POSTPROCEDURE EVALUATION
Patient: Tyrone Ma    Procedure Summary       Date: 06/18/24 Room / Location: [facility]    Anesthesia Start: 1550 Anesthesia Stop: 1617    Procedure: EGD Diagnosis:       Esophageal dysphagia      Esophageal dysphagia    Scheduled Providers: Sorin Hui MD; Etienne Robles MD; TE Duvall-MIGUELANGEL; Geneva Rain RN Responsible Provider: Etienne Robles MD    Anesthesia Type: MAC ASA Status: 2            Anesthesia Type: MAC    Vitals Value Taken Time   /68 06/18/24 1616   Temp 37.2 °C (99 °F) 06/18/24 1608   Pulse 57 06/18/24 1617   Resp 19 06/18/24 1608   SpO2 99 % 06/18/24 1617   Vitals shown include unfiled device data.    Anesthesia Post Evaluation    Patient location during evaluation: PACU  Patient participation: complete - patient participated  Level of consciousness: awake  Pain management: adequate  Airway patency: patent  Cardiovascular status: acceptable  Respiratory status: acceptable  Hydration status: acceptable  Postoperative Nausea and Vomiting: none    No notable events documented.

## 2024-06-18 NOTE — ANESTHESIA PREPROCEDURE EVALUATION
Patient: Tyrone Ma    Procedure Information       Date/Time: 06/18/24 1510    Scheduled providers: Sorin Hui MD; Etienne Robles MD; TE Duvall-MIGUELANGEL; Geneva Rain RN    Procedure: EGD    Location: [facility]            Relevant Problems   Cardiac   (+) Hyperlipemia      Pulmonary   (+) Obstructive sleep apnea      GI   (+) GERD (gastroesophageal reflux disease)      Endocrine   (+) Severe obesity (BMI 35.0-35.9 with comorbidity) (Multi)      Musculoskeletal   (+) Degenerative joint disease of left shoulder   (+) Localized osteoarthritis of right shoulder      HEENT   (+) Asymmetrical sensorineural hearing loss       Clinical information reviewed:   Tobacco  Allergies  Meds   Med Hx  Surg Hx   Fam Hx  Soc Hx         Past Medical History:   Diagnosis Date    GERD (gastroesophageal reflux disease)     HL (hearing loss)     ENRIQUE (obstructive sleep apnea)       Past Surgical History:   Procedure Laterality Date    COLONOSCOPY      LASIK       Social History     Tobacco Use    Smoking status: Never    Smokeless tobacco: Never   Vaping Use    Vaping status: Never Used   Substance Use Topics    Alcohol use: Never    Drug use: Never      Current Outpatient Medications   Medication Instructions    clindamycin (Cleocin T) 1 % lotion Topical, 2 times daily    ketoconazole (NIZOral) 2 % cream As directed    omeprazole (PriLOSEC) 40 mg DR capsule TAKE 1 CAPSULE BY MOUTH ONCE DAILY BEFORE BREAKFAST. DO NOT CRUSH OR CHEW.    rosuvastatin (CRESTOR) 10 mg, oral, Daily    sildenafil (Viagra) 100 mg tablet TAKE 1 TABLET DAILY 1 HOUR BEFORE NEEDED      No Known Allergies     Chemistry    Lab Results   Component Value Date/Time     02/20/2024 1512    K 4.3 02/20/2024 1512     02/20/2024 1512    CO2 27 02/20/2024 1512    BUN 18 02/20/2024 1512    CREATININE 0.66 02/20/2024 1512    Lab Results   Component Value Date/Time    CALCIUM 9.4 02/20/2024 1512    ALKPHOS 45 02/20/2024 1512     "AST 20 02/20/2024 1512    ALT 20 02/20/2024 1512    BILITOT 0.6 02/20/2024 1512          Lab Results   Component Value Date    HGBA1C 5.4 02/20/2024     Lab Results   Component Value Date/Time    WBC 9.9 02/20/2024 1512    HGB 15.0 02/20/2024 1512    HCT 44.5 02/20/2024 1512     02/20/2024 1512     No results found for: \"PROTIME\", \"PTT\", \"INR\"  No results found for: \"ABORH\"  No results found for this or any previous visit (from the past 4464 hour(s)).  No results found for this or any previous visit from the past 1095 days.  Nuclear stress 10/16/2019:   IMPRESSION:  1. Normal stress myocardial perfusion imaging.  2. Well-maintained left ventricular function.  68%    Visit Vitals  /76   Pulse 59   Temp 36.6 °C (97.9 °F) (Temporal)   Resp 16   Ht 1.753 m (5' 9\")   Wt 88.6 kg (195 lb 5.2 oz)   SpO2 96%   BMI 28.84 kg/m²   Smoking Status Never   BSA 2.08 m²     NPO/Void Status  Carbohydrate Drink Given Prior to Surgery? : N  Date of Last Liquid: 06/18/24  Time of Last Liquid: 1100  Date of Last Solid: 06/17/24  Time of Last Solid: 1900  Last Intake Type: Clear fluids (water)  Time of Last Void: 1430         Physical Exam    Airway  Mallampati: III  TM distance: >3 FB  Neck ROM: full     Cardiovascular   Rhythm: regular  Rate: normal     Dental - normal exam     Pulmonary   Breath sounds clear to auscultation     Abdominal - normal exam              Anesthesia Plan    History of general anesthesia?: yes  History of complications of general anesthesia?: no    ASA 2     MAC   (With standard ASA monitoring.)  intravenous induction   Anesthetic plan and risks discussed with patient.    Plan discussed with CRNA and CAA.        "

## 2024-06-18 NOTE — DISCHARGE INSTRUCTIONS

## 2024-06-19 ENCOUNTER — APPOINTMENT (OUTPATIENT)
Dept: OTOLARYNGOLOGY | Facility: CLINIC | Age: 69
End: 2024-06-19
Payer: MEDICARE

## 2024-06-19 ASSESSMENT — PAIN SCALES - GENERAL: PAINLEVEL_OUTOF10: 0 - NO PAIN

## 2024-06-25 LAB
LABORATORY COMMENT REPORT: NORMAL
PATH REPORT.FINAL DX SPEC: NORMAL
PATH REPORT.GROSS SPEC: NORMAL
PATH REPORT.TOTAL CANCER: NORMAL

## 2024-07-01 ENCOUNTER — TELEPHONE (OUTPATIENT)
Dept: GASTROENTEROLOGY | Facility: CLINIC | Age: 69
End: 2024-07-01
Payer: MEDICARE

## 2024-07-01 NOTE — TELEPHONE ENCOUNTER
Result Communication    Resulted Orders   Esophagogastroduodenoscopy (EGD)    Addendum: 6/25/2024    Impression  Ectopic gastric mucosa in the upper third of the esophagus  C0M1 Davalos's esophagus; performed targeted cold forceps biopsy  Obstructing Schatzki ring in the GE junction; dilated to 18 mm end size. Dilation caused improved passage of the scope and improved lumen appearance  2 cm type I hiatal hernia  Mild erythematous mucosa in the body of the stomach and antrum; performed cold forceps biopsy  The duodenal bulb and 2nd part of the duodenum appeared normal.      Findings  Ectopic gastric mucosa in the upper third of the esophagus  C0M1 Davalos's esophagus observed with 3 tongues and no associated lesion without using a distal attachment cap. The Z-line was 37 cm from the incisors and the top of gastric folds was 38 cm from the incisors. The most proximal island was 37 cm from the incisors. The total length of Davalos's esophagus was 1 cm; performed targeted cold forceps biopsy  Obstructing Schatzki ring in the GE junction (37 cm from the incisors); dilated with balloon dilator from 15 mm starting size to 18 mm end size with step size of 16.5 mm. Dilation caused improved passage of the scope and improved lumen appearance  2 cm sliding hiatal hernia (type I hiatal hernia) without Dino lesions present - GE junction 38 cm from the incisors, diaphragmatic impression 40 cm from the incisors, confirmed by retroflexion. Hill grade II hiatal hernia  Mild, patchy erythematous mucosa in the body of the stomach and antrum; performed cold forceps biopsy;  The duodenal bulb and 2nd part of the duodenum appeared normal.      Recommendation   Await pathology results    Follow up with primary gastroenterologist    Follow up with PCP         Indication  Esophageal dysphagia    Staff  Staff Role   Sorin Hui MD Proceduralist       Medications  See Anesthesia Record.     Preprocedure  A history and physical has  been performed, and patient medication allergies have been reviewed. The patient's tolerance of previous anesthesia has been reviewed. The risks and benefits of the procedure and the sedation options and risks were discussed with the patient. All questions were answered and informed consent obtained.    Details of the Procedure  The patient underwent monitored anesthesia care, which was administered by an anesthesia professional. The patient's blood pressure, ECG, ETCO2, heart rate, level of consciousness, oxygen and respirations were monitored throughout the procedure. The scope was introduced through the mouth and advanced to the second part of the duodenum. Retroflexion was performed in the cardia. The patient's estimated blood loss was minimal (<5 mL). The procedure was not difficult. The patient tolerated the procedure well. There were no apparent adverse events.     Events  Procedure Events   Event Event Time   ENDO SCOPE IN TIME 6/18/2024  3:53 PM   ENDO SCOPE OUT TIME 6/18/2024  4:04 PM       Specimens  ID Type Source Tests Collected by Time   1 : STOMACH ANTRUM AND BODY Tissue STOMACH ANTRUM BIOPSY SURGICAL PATHOLOGY EXAM Mayra Morales RN 6/18/2024 1556   2 : COLD BX Tissue ESOPHAGUS DISTAL BIOPSY SURGICAL PATHOLOGY EXAM Mayra Morales RN 6/18/2024 1602       Procedure Location  [address] [address] [phone]    Referring Provider  TE Hernandez-CNP    Procedure Provider  Sorin Hui MD      Narrative    Table formatting from the original result was not included.  Impression  Ectopic gastric mucosa in the upper third of the esophagus  C0M1 Davalos's esophagus; performed targeted cold forceps biopsy  Obstructing Schatzki ring in the GE junction; dilated to 18 mm end size.   Dilation caused improved passage of the scope and improved lumen   appearance  2 cm type I hiatal hernia  Mild erythematous mucosa in the body of the  stomach and antrum; performed   cold forceps biopsy  The duodenal bulb and 2nd part of the duodenum appeared normal.      Findings  Ectopic gastric mucosa in the upper third of the esophagus  C0M1 Davalos's esophagus observed with 3 tongues and no associated lesion   without using a distal attachment cap. The Z-line was 37 cm from the   incisors and the top of gastric folds was 38 cm from the incisors. The   most proximal island was 37 cm from the incisors. The total length of   Davalos's esophagus was 1 cm; performed targeted cold forceps biopsy  Obstructing Schatzki ring in the GE junction (37 cm from the incisors);   dilated from 15 mm starting size to 18 mm end size with step size of 16.5   mm. Dilation caused improved passage of the scope and improved lumen   appearance  2 cm sliding hiatal hernia (type I hiatal hernia) without Dino lesions   present - GE junction 38 cm from the incisors, diaphragmatic impression 40   cm from the incisors, confirmed by retroflexion. Hill grade II hiatal   hernia  Mild, patchy erythematous mucosa in the body of the stomach and antrum;   performed cold forceps biopsy;  The duodenal bulb and 2nd part of the duodenum appeared normal.      Recommendation   Await pathology results    Follow up with primary gastroenterologist    Follow up with PCP         Indication  Esophageal dysphagia    Staff  Staff Role   Soirn Hui MD Proceduralist       Medications  See Anesthesia Record.     Preprocedure  A history and physical has been performed, and patient medication   allergies have been reviewed. The patient's tolerance of previous   anesthesia has been reviewed. The risks and benefits of the procedure and   the sedation options and risks were discussed with the patient. All   questions were answered and informed consent obtained.    Details of the Procedure  The patient underwent monitored anesthesia care, which was administered by   an anesthesia professional. The patient's  blood pressure, ECG, ETCO2,   heart rate, level of consciousness, oxygen and respirations were monitored   throughout the procedure. The scope was introduced through the mouth and   advanced to the second part of the duodenum. Retroflexion was performed in   the cardia. The patient's estimated blood loss was minimal (<5 mL). The   procedure was not difficult. The patient tolerated the procedure well.   There were no apparent adverse events.     Events  Procedure Events   Event Event Time   ENDO SCOPE IN TIME 6/18/2024  3:53 PM   ENDO SCOPE OUT TIME 6/18/2024  4:04 PM       Specimens  ID Type Source Tests Collected by Time   1 : STOMACH ANTRUM AND BODY Tissue STOMACH ANTRUM BIOPSY SURGICAL   PATHOLOGY EXAM Mayra Morales RN 6/18/2024 1556   2 : COLD BX Tissue ESOPHAGUS DISTAL BIOPSY SURGICAL PATHOLOGY EXAM Mayra Morales RN 6/18/2024 1602       Procedure Location  [address] [address]  [phone]    Referring Provider  TE Hernandez-CNP    Procedure Provider  Sorin Hui MD         11:16 AM      Results were successfully communicated with the patient and they did not acknowledge their understanding. Will discuss further at follow-up on 7/17

## 2024-07-03 ENCOUNTER — OFFICE VISIT (OUTPATIENT)
Dept: OTOLARYNGOLOGY | Facility: CLINIC | Age: 69
End: 2024-07-03
Payer: MEDICARE

## 2024-07-03 VITALS
RESPIRATION RATE: 18 BRPM | DIASTOLIC BLOOD PRESSURE: 74 MMHG | WEIGHT: 197 LBS | BODY MASS INDEX: 29.18 KG/M2 | SYSTOLIC BLOOD PRESSURE: 111 MMHG | HEIGHT: 69 IN | HEART RATE: 48 BPM | TEMPERATURE: 98.1 F | OXYGEN SATURATION: 96 %

## 2024-07-03 DIAGNOSIS — R13.10 DYSPHAGIA, UNSPECIFIED TYPE: Primary | ICD-10-CM

## 2024-07-03 DIAGNOSIS — G47.33 OBSTRUCTIVE SLEEP APNEA: ICD-10-CM

## 2024-07-03 PROCEDURE — 1036F TOBACCO NON-USER: CPT | Performed by: STUDENT IN AN ORGANIZED HEALTH CARE EDUCATION/TRAINING PROGRAM

## 2024-07-03 PROCEDURE — 3008F BODY MASS INDEX DOCD: CPT | Performed by: STUDENT IN AN ORGANIZED HEALTH CARE EDUCATION/TRAINING PROGRAM

## 2024-07-03 PROCEDURE — 1160F RVW MEDS BY RX/DR IN RCRD: CPT | Performed by: STUDENT IN AN ORGANIZED HEALTH CARE EDUCATION/TRAINING PROGRAM

## 2024-07-03 PROCEDURE — 1157F ADVNC CARE PLAN IN RCRD: CPT | Performed by: STUDENT IN AN ORGANIZED HEALTH CARE EDUCATION/TRAINING PROGRAM

## 2024-07-03 PROCEDURE — 99212 OFFICE O/P EST SF 10 MIN: CPT | Performed by: STUDENT IN AN ORGANIZED HEALTH CARE EDUCATION/TRAINING PROGRAM

## 2024-07-03 PROCEDURE — 1159F MED LIST DOCD IN RCRD: CPT | Performed by: STUDENT IN AN ORGANIZED HEALTH CARE EDUCATION/TRAINING PROGRAM

## 2024-07-03 PROCEDURE — 1123F ACP DISCUSS/DSCN MKR DOCD: CPT | Performed by: STUDENT IN AN ORGANIZED HEALTH CARE EDUCATION/TRAINING PROGRAM

## 2024-07-03 PROCEDURE — 1126F AMNT PAIN NOTED NONE PRSNT: CPT | Performed by: STUDENT IN AN ORGANIZED HEALTH CARE EDUCATION/TRAINING PROGRAM

## 2024-07-03 ASSESSMENT — ENCOUNTER SYMPTOMS
LOSS OF SENSATION IN FEET: 0
DEPRESSION: 0
OCCASIONAL FEELINGS OF UNSTEADINESS: 0

## 2024-07-03 ASSESSMENT — PAIN SCALES - GENERAL: PAINLEVEL: 0-NO PAIN

## 2024-07-03 NOTE — PROGRESS NOTES
"Subjective   Patient ID: Tyrone Ma is a 69 y.o. male who presents for Follow-up (throat).    History 10/18/2023: Referred by Dr. Sol for obstructive sleep apnea and consideration of surgical intervention.    The patient recently started CPAP in May 2023. Using CPAP 4-5 hours a night. He has not appreciated much improvement in sleep quality. His PCP and sleep equipment company are currently managing his CPAP and mask optimization.  He has changed his mask somewhat and feels that this ihas improved his compliance. Notes occasional SOB which causes him to take off his mask.    Update 4/17/2024:  Recently had environmental allergies exacerbation starting March 20. Was associated with trouble with his swallowing; noting difficulty with swallowing. Has a gagging sensation and sensation of regurgitation. Has had only a few episodes of regurgitation. Finds that bread, salad, and meats are harder to get down. No nausea/bloating. Was seen at an urgent care and prescribed Augmentin for possible sinus infection; did not appreciate improvement. Has intermittently tried some Protonix. His voice has gotten \"gravelly.\" Never smoker.    Update 7/3/2024:  In general thinks that recent GI interventions have helped his swallowing issues. He is status post EGD with esophageal dilation and finding of Davalos esophagus.    Objective   Physical Exam  CONSTITUTIONAL: Well appearing male who appears stated age.  PSYCHIATRIC: Alert, appropriate mood and affect.  RESPIRATORY: Normal inspiration and expiration and chest wall expansion; no use of accessory muscles to breathe.  VOICE: Clear speech with mild hoarseness.  Improved roughness and \"gravelly\" voicing. No stridor nor stertor.  HEAD, FACE, AND SKIN: Symmetric facial feature.  EARS: Ears clear bilaterally.  Tympanic membranes intact and in neutral position.  OROPHARYNX: No lesion nor mucosal abnormality. The uvula was normal appearing. The tonsils were 2+. Prominent " gag.    Assessment/Plan   Diagnoses and all orders for this visit:  Dysphagia, unspecified type  Obstructive sleep apnea        Dysphagia, choking episodes, dysphonia  The patient reports that starting in March 2024 he began having increasing episodes of difficulty with swallowing.  These have been associated with change in voice.  On initial flexible laryngoscopy I appreciate some clear drainage within the nasopharynx as well as generalized edema in the supraglottis and postcricoid areas.    The patient had a relatively normal MBS with finding of potential slowing at the distal esophagus.  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  In general the patient notes some improvement since these procedures.  Given these findings I would recommend continued follow-up with gastroenterology and as needed follow-up with me for this issue.    2.  Obstructive sleep apnea  The patient was previously diagnosed with severe obstructive sleep apnea.  I previously discussed with the patient his sleep study results as well as his physical exam in detail; reassurance was provided.  The patient has severe sleep apnea that is a mixture of obstructive and central events.  Given the patient's anatomy we discussed that he is a poor candidate for consideration of palato-pharyngeal surgery.  In addition, because of his multiple central apneas he would not be a good inspire candidate.  I recommended that given his sleep study results he continue CPAP and work with his company to continue to optimize his usage.     However, if after 6 to 12 months of CPAP use he is finding persistent difficulty it would not be unreasonable to see one of my sleep surgeon colleagues to discuss options to optimize his anatomy.  He may benefit from straightening of the septum and would also likely  benefit from advancement of his mandible given his overbite.     This note was created using speech recognition transcription software. Despite proofreading, typographical errors may be present that affect the meaning of the content. Please contact my office with any questions.

## 2024-07-17 ENCOUNTER — OFFICE VISIT (OUTPATIENT)
Dept: GASTROENTEROLOGY | Facility: CLINIC | Age: 69
End: 2024-07-17
Payer: MEDICARE

## 2024-07-17 VITALS
WEIGHT: 200 LBS | DIASTOLIC BLOOD PRESSURE: 77 MMHG | HEART RATE: 96 BPM | RESPIRATION RATE: 16 BRPM | TEMPERATURE: 98.1 F | BODY MASS INDEX: 28.63 KG/M2 | SYSTOLIC BLOOD PRESSURE: 127 MMHG | HEIGHT: 70 IN

## 2024-07-17 DIAGNOSIS — R13.19 ESOPHAGEAL DYSPHAGIA: Primary | ICD-10-CM

## 2024-07-17 PROCEDURE — 1036F TOBACCO NON-USER: CPT | Performed by: NURSE PRACTITIONER

## 2024-07-17 PROCEDURE — 99213 OFFICE O/P EST LOW 20 MIN: CPT | Performed by: NURSE PRACTITIONER

## 2024-07-17 PROCEDURE — 1126F AMNT PAIN NOTED NONE PRSNT: CPT | Performed by: NURSE PRACTITIONER

## 2024-07-17 PROCEDURE — 3008F BODY MASS INDEX DOCD: CPT | Performed by: NURSE PRACTITIONER

## 2024-07-17 PROCEDURE — 1123F ACP DISCUSS/DSCN MKR DOCD: CPT | Performed by: NURSE PRACTITIONER

## 2024-07-17 PROCEDURE — 1159F MED LIST DOCD IN RCRD: CPT | Performed by: NURSE PRACTITIONER

## 2024-07-17 PROCEDURE — 1157F ADVNC CARE PLAN IN RCRD: CPT | Performed by: NURSE PRACTITIONER

## 2024-07-17 RX ORDER — PANTOPRAZOLE SODIUM 40 MG/1
40 TABLET, DELAYED RELEASE ORAL DAILY
Qty: 30 TABLET | Refills: 11 | Status: SHIPPED | OUTPATIENT
Start: 2024-07-17 | End: 2025-07-17

## 2024-07-17 ASSESSMENT — ENCOUNTER SYMPTOMS
WHEEZING: 0
CHILLS: 0
ALLERGIC/IMMUNOLOGIC NEGATIVE: 1
APNEA: 0
FATIGUE: 0
NEUROLOGICAL NEGATIVE: 1
EYES NEGATIVE: 1
MUSCULOSKELETAL NEGATIVE: 1
FEVER: 0
RESPIRATORY NEGATIVE: 1
DIFFICULTY URINATING: 0
HEMATOLOGIC/LYMPHATIC NEGATIVE: 1
STRIDOR: 0
ENDOCRINE NEGATIVE: 1
CARDIOVASCULAR NEGATIVE: 1
SHORTNESS OF BREATH: 0
DIAPHORESIS: 0
PSYCHIATRIC NEGATIVE: 1
ROS GI COMMENTS: SEE HPI
COUGH: 0
CHEST TIGHTNESS: 0

## 2024-07-17 ASSESSMENT — PAIN SCALES - GENERAL: PAINLEVEL: 0-NO PAIN

## 2024-07-17 NOTE — PATIENT INSTRUCTIONS
It was nice to see you    Start pantoprazole  in the morning in the about 30-60 minutes before breakfast    Chew food well, take sips in between    You will need another EGD in 3 years for screening however if you were to develop symptoms such as new heartburn, worsening dysphagia, abdominal pain please follow up sooner    Follow up after esophagram     GERD - Avoid overeating at mealtime.  Stay upright for 2 hours after eating.  Do not eat for 3-4 hours before going to bed.  Elevated the head of the bed 6 inches when you are sleeping. Avoid excessive amounts of fried foods, acidic foods, spicy foods, alcohol, caffeine, peppermint, spearmint, chocolate, non-steroidal anti- inflammatory medications, such as, Ibuprofen, Advil, Motrin, Aleve, Naproxen, Naprosyn, Moloxicam, Etodolac, and Voltaren.

## 2024-07-17 NOTE — PROGRESS NOTES
Subjective   Patient ID: Tyrone Ma is a 69 y.o. male who presents for Dysphagia. PMH: HLD, obesity, ENRIQUE (CPAP). Last seen in GI in May.     Underwent in EGD on 6/18 which found Davalos's esophagus and obstructing Schatzki's ring.   Biopsy found intestinal metaplasia. Repeat EGD in 3 years.   Dilated     Using Omeprazole daily.     Still has dysphagia to certain meats   No dentures  Barium swallow unremarkable     Dysphagia has improved, not 100 percent, still has problems with meats    Colonoscopy 2021-2 transverse colon polyps -TA    Family Hx: Denies a family hx of CRC, GI cancers, Davalos's     Review of Systems   Constitutional:  Negative for chills, diaphoresis, fatigue and fever.   HENT: Negative.     Eyes: Negative.    Respiratory: Negative.  Negative for apnea, cough, chest tightness, shortness of breath, wheezing and stridor.    Cardiovascular: Negative.    Gastrointestinal:         See HPI    Endocrine: Negative.    Genitourinary: Negative.  Negative for difficulty urinating.   Musculoskeletal: Negative.    Skin: Negative.    Allergic/Immunologic: Negative.    Neurological: Negative.    Hematological: Negative.    Psychiatric/Behavioral: Negative.         Objective   Physical Exam  Neurological:      Mental Status: He is alert.         Assessment/Plan   Diagnoses and all orders for this visit:  Esophageal dysphagia         70 yo male with  h/o sleep apnea (on CPAP), HLD presents today for follow-up. Last seen in May for dysphagia. At that time underwent an EGD and found to have a Schatzki's ring and Davalos's esophagus with intentional metaplasia, dilated. He will repeat EGD in 3 years for Davalos's surveillance. Continue Omeprazole. Still has intermittent dysphagia with meats. Will complete esophagram, chew food well.   Follow-up after esophagram.       TE Hernandez-CNP 07/17/24 1:07 PM

## 2024-08-06 ENCOUNTER — APPOINTMENT (OUTPATIENT)
Dept: RADIOLOGY | Facility: HOSPITAL | Age: 69
End: 2024-08-06
Payer: MEDICARE

## 2024-08-06 DIAGNOSIS — R13.19 ESOPHAGEAL DYSPHAGIA: ICD-10-CM

## 2024-08-06 PROCEDURE — 2500000005 HC RX 250 GENERAL PHARMACY W/O HCPCS: Performed by: STUDENT IN AN ORGANIZED HEALTH CARE EDUCATION/TRAINING PROGRAM

## 2024-08-06 PROCEDURE — 74220 X-RAY XM ESOPHAGUS 1CNTRST: CPT

## 2024-08-06 PROCEDURE — 74221 X-RAY XM ESOPHAGUS 2CNTRST: CPT | Performed by: RADIOLOGY

## 2024-08-06 PROCEDURE — A9698 NON-RAD CONTRAST MATERIALNOC: HCPCS | Performed by: STUDENT IN AN ORGANIZED HEALTH CARE EDUCATION/TRAINING PROGRAM

## 2024-08-12 NOTE — PROGRESS NOTES
Subjective    Patient ID: Tyrone Ma is a 69 y.o. male.    Chief Complaint: No chief complaint on file.     Last Surgery: No surgery found  Last Surgery Date: No surgery found    HPI  The patient is a pleasant 68 year-old right hand dominant male presenting today for evaluation of their right shoulder. He is presenting today with complaints of shoulder pain that has been progressively worsening. He has had the pain for the last 3-4 years. He does not feel that the pain is terrible, but his wife says he complains about it often. He has not tried injections yet but he has done therapy. He did not have success with therapy. He notes that his left shoulder is beginning to hurt.      11/07/23  Tyrone returns to the clinic today for a repeat follow up visit regarding his right shoulder.     He explains today that the injections are helping. He is hoping to have a repeat done today.     02/06/24  Tyrone returns to the clinic today for a follow up visit regarding his right shoulder.     He reports that the injections are continuing to take the edge off his shoulder pain.     05/07/24  Tyrone returns to the clinic today for a repeat follow up visit regarding his right shoulder.     He reports that injections are still beneficial. He notices when his injection is about to wear off as he starts to hurt.     8/13/24   Tyrone Ma is a 69 y.o. male returning to the clinic for a repeat follow up visit regarding his right shoulder.    He reports that his last injection just started wearing off about 10 days ago.       Past medical history, surgical history, social history, and family history were all reviewed and are as per the [facility] patient health history questionnaire form that I signed and scanned into the chart today.    Objective   Patient is a well-developed, well-nourished male in no acute distress. Breathes with normal chest rises. Pupils are round and symmetric today. Awake, alert, and oriented x3.     Examination  of the left shoulder today reveals the skin to be intact. There is no sign any atrophy lesions or abrasions. There is no pain to palpation of the bony prominences. Cervical lymphadenopathy examined, and this was negative.   Patient had 5 out of 5 wrist flexors extension, thumb extension bilaterally. Sensation was intact to light touch to median, ulnar, radial axillary, and musculocutaneous nerves bilaterally. Positive radial pulse bilaterally. Provocative maneuvers on the left side today were negative.   Range of motion of the left shoulder revealed 0-170° of forward elevation. 0-45° of external rotation. Internal rotation was to T 12.      Examination of the right shoulder today reveals the skin to be intact. There is no sign any atrophy lesions or abrasions. There is no pain to palpation of the bony prominences. Cervical lymphadenopathy examined, and this was negative.   Patient had 5 out of 5 wrist flexors extension, thumb extension, bilaterally. Sensation was intact to light touch to median, ulnar, radial, axillary, and musculocutaneous nerves bilaterally. Positive radial pulse bilaterally. Provocative maneuvers on the right side today were negative.   Range of motion of the right shoulder revealed 0-130° of forward elevation, passively correctable to 17. 0-35° of external rotation. And internal rotation up to T 12. Negative modified belly press test      Image Results:    Patient ID: Tyrone Ma is a 69 y.o. male.    L Inj/Asp: R glenohumeral on 8/13/2024 3:50 PM  Indications: pain  Details: 20 G needle, anterior approach  Medications: 40 mg triamcinolone acetonide 40 mg/mL; 4 mL lidocaine 10 mg/mL (1 %)  Outcome: tolerated well, no immediate complications  Procedure, treatment alternatives, risks and benefits explained, specific risks discussed. Consent was given by the patient. Immediately prior to procedure a time out was called to verify the correct patient, procedure, equipment, support staff and  site/side marked as required. Patient was prepped and draped in the usual sterile fashion.           Assessment/Plan   Encounter Diagnoses:  No diagnosis found.  Patient with endstage arthritis of the right shoulder     At this time we had a long discussion about the various options for shoulder arthritis.      1. Do nothing and continue activity modifications.   2. Consider cortisone injections into the glenohumeral joint. This would give pain relief that is temporary. This would not stop the progression of arthritis. For some patients, this injection can provide no relief at all, relief for 2 weeks, 4 weeks, 3 months or longer. The risk of the injection is fairly minimal but does include included a very small chance of infection.   3. Option is a total shoulder replacement. This will give the patient a more permanent solution to pain relief. It would also improve function. There is no rush to this procedure it is an elective procedure.     We did a repeat injection today as he is getting some relief from them still.  He tolerated this well. We will continue with injections until he is ready to consider a shoulder replacement.     No orders of the defined types were placed in this encounter.    Follow up in 3 months    Scribe Attestation  By signing my name below, Erica SEO Scribe   attest that this documentation has been prepared under the direction and in the presence of Victor Hugo Somers MD.

## 2024-08-13 ENCOUNTER — OFFICE VISIT (OUTPATIENT)
Dept: ORTHOPEDIC SURGERY | Facility: HOSPITAL | Age: 69
End: 2024-08-13
Payer: MEDICARE

## 2024-08-13 ENCOUNTER — HOSPITAL ENCOUNTER (OUTPATIENT)
Dept: RADIOLOGY | Facility: HOSPITAL | Age: 69
Discharge: HOME | End: 2024-08-13
Payer: MEDICARE

## 2024-08-13 DIAGNOSIS — M25.511 RIGHT SHOULDER PAIN, UNSPECIFIED CHRONICITY: ICD-10-CM

## 2024-08-13 DIAGNOSIS — M25.511 RIGHT SHOULDER PAIN, UNSPECIFIED CHRONICITY: Primary | ICD-10-CM

## 2024-08-13 PROCEDURE — 1157F ADVNC CARE PLAN IN RCRD: CPT | Performed by: ORTHOPAEDIC SURGERY

## 2024-08-13 PROCEDURE — 1123F ACP DISCUSS/DSCN MKR DOCD: CPT | Performed by: ORTHOPAEDIC SURGERY

## 2024-08-13 PROCEDURE — 2500000005 HC RX 250 GENERAL PHARMACY W/O HCPCS: Performed by: ORTHOPAEDIC SURGERY

## 2024-08-13 PROCEDURE — 20610 DRAIN/INJ JOINT/BURSA W/O US: CPT | Mod: RT | Performed by: ORTHOPAEDIC SURGERY

## 2024-08-13 PROCEDURE — 73030 X-RAY EXAM OF SHOULDER: CPT | Mod: RIGHT SIDE | Performed by: RADIOLOGY

## 2024-08-13 PROCEDURE — 99213 OFFICE O/P EST LOW 20 MIN: CPT | Performed by: ORTHOPAEDIC SURGERY

## 2024-08-13 PROCEDURE — 73030 X-RAY EXAM OF SHOULDER: CPT | Mod: RT

## 2024-08-13 PROCEDURE — 2500000004 HC RX 250 GENERAL PHARMACY W/ HCPCS (ALT 636 FOR OP/ED): Performed by: ORTHOPAEDIC SURGERY

## 2024-08-19 RX ORDER — TRIAMCINOLONE ACETONIDE 40 MG/ML
40 INJECTION, SUSPENSION INTRA-ARTICULAR; INTRAMUSCULAR
Status: COMPLETED | OUTPATIENT
Start: 2024-08-13 | End: 2024-08-13

## 2024-08-19 RX ORDER — LIDOCAINE HYDROCHLORIDE 10 MG/ML
4 INJECTION INFILTRATION; PERINEURAL
Status: COMPLETED | OUTPATIENT
Start: 2024-08-13 | End: 2024-08-13

## 2024-08-29 ENCOUNTER — APPOINTMENT (OUTPATIENT)
Dept: PRIMARY CARE | Facility: CLINIC | Age: 69
End: 2024-08-29
Payer: MEDICARE

## 2024-08-29 VITALS
SYSTOLIC BLOOD PRESSURE: 132 MMHG | DIASTOLIC BLOOD PRESSURE: 76 MMHG | BODY MASS INDEX: 29.5 KG/M2 | OXYGEN SATURATION: 95 % | RESPIRATION RATE: 16 BRPM | HEART RATE: 51 BPM | WEIGHT: 205.6 LBS

## 2024-08-29 DIAGNOSIS — M62.81 GENERALIZED MUSCLE WEAKNESS: ICD-10-CM

## 2024-08-29 DIAGNOSIS — I71.40 ABDOMINAL AORTIC ANEURYSM (AAA) WITHOUT RUPTURE, UNSPECIFIED PART (CMS-HCC): Primary | ICD-10-CM

## 2024-08-29 DIAGNOSIS — R73.09 ABNORMAL GLUCOSE: ICD-10-CM

## 2024-08-29 DIAGNOSIS — R13.19 ESOPHAGEAL DYSPHAGIA: ICD-10-CM

## 2024-08-29 DIAGNOSIS — E78.01 FAMILIAL HYPERCHOLESTEROLEMIA: ICD-10-CM

## 2024-08-29 DIAGNOSIS — E78.00 PURE HYPERCHOLESTEROLEMIA: ICD-10-CM

## 2024-08-29 DIAGNOSIS — I10 ESSENTIAL (PRIMARY) HYPERTENSION: ICD-10-CM

## 2024-08-29 RX ORDER — ROSUVASTATIN CALCIUM 10 MG/1
10 TABLET, COATED ORAL DAILY
Qty: 90 TABLET | Refills: 3 | Status: SHIPPED | OUTPATIENT
Start: 2024-08-29

## 2024-08-29 RX ORDER — PANTOPRAZOLE SODIUM 40 MG/1
40 TABLET, DELAYED RELEASE ORAL DAILY
Qty: 90 TABLET | Refills: 3 | Status: SHIPPED | OUTPATIENT
Start: 2024-08-29 | End: 2025-08-29

## 2024-08-29 NOTE — PROGRESS NOTES
Subjective   Patient ID: Tyrone Ma is a 69 y.o. male who presents for Follow-up (6 mth med and BP check).    Last Physical : __1__ Years ago     Pt's PMH, PSH, SH, FH , meds and allergies was obtained / reviewed and updated .     Dental  Visits : Y  Vision issues : N  Hearing issues : N    Immunizations : Y    Diet :  Could be better   Exercise:  Not regularly  Weight concerns : None    Alcohol: as noted in SH  Tobacco: as noted in SH  Recreational drugs :  None /as noted in SH     Sexually active : Active   Contraception :   Erectile dysfunction :    Colorectal cancer screening   Prostate cancer screening     Metabolic screening   - Lipids   - Glucose     ==================================    Visit Vitals  /76   Pulse 51   Resp 16   Wt 93.3 kg (205 lb 9.6 oz)   SpO2 95%   BMI 29.50 kg/m²   Smoking Status Never   BSA 2.15 m²      =====================  Review of Systems:    Constitutional: no chills, no fever and no night sweats.     Eyes: no blurred vision and no eyesight problems.     ENT: no hearing loss, no nasal congestion, no nasal discharge, no hoarseness and no sore throat.     Cardiovascular: no chest pain, no intermittent leg claudication, no lower extremity edema, no palpitations and no syncope.     Respiratory: no cough, no shortness of breath during exertion, no shortness of breath at rest and no wheezing.     Gastrointestinal: no abdominal pain, no constipation, no blood in stools, no diarrhea, no melena, no nausea, no rectal pain and no vomiting.     Genitourinary: no dysuria, no change in urinary frequency, no urinary hesitancy and no feelings of urinary urgency.     Musculoskeletal: no arthralgias, no back pain and no myalgias.     Integumentary: no new skin lesions and no rashes.     Neurological: no difficulty walking, no headache, no limb weakness, no numbness and no tingling.     Psychiatric: no anxiety, no depression, no anhedonia and no substance use disorders.     Endocrine: no  recent weight gain and no recent weight loss.     Hematologic/Lymphatic: no tendency for easy bruising and no swollen glands.          All other systems have been reviewed and are negative for complaint.    =====================================================    Physical exam :    Constitutional: Alert and in no acute distress. Well developed, well nourished.     Eyes: Normal external exam. Pupils were equal in size, round, reactive to light (PERRL) with normal accommodation and extraocular movements intact (EOMI).     Ears, Nose, Mouth, and Throat: External inspection of ears and nose: Normal.  Otoscopic examination: Normal.      Neck: No neck mass was observed. Supple.     Cardiovascular: Heart rate and rhythm were normal, normal S1 and S2, no gallops, no murmurs and no pericardial rub    Pulmonary: No respiratory distress. Clear bilateral breath sounds.     Abdomen: Soft nontender; no abdominal mass palpated. No organomegaly.     Musculoskeletal: No joint swelling seen, normal movements of all extremities. Range of motion: Normal.  Muscle strength/tone: Normal.      Skin: Normal skin color and pigmentation, normal skin turgor, and no rash.     Neurologic: Deep tendon reflexes were 2+ and symmetric. Sensation: Normal.     Psychiatric: Judgment and insight: Intact. Mood and affect: Normal.    Lymphatic : Cervical/ axillary/ groin Lns Palpable/ non palpable       Assessment/Plan    Problem List Items Addressed This Visit             ICD-10-CM    Generalized muscle weakness M62.81    Relevant Orders    Lipid Panel (Completed)    CBC (Completed)    TSH with reflex to Free T4 if abnormal (Completed)    Hemoglobin A1C (Completed)    Comprehensive Metabolic Panel (Completed)    Hyperlipemia E78.5    Relevant Medications    rosuvastatin (Crestor) 10 mg tablet    Other Relevant Orders    Lipid Panel (Completed)    CBC (Completed)    TSH with reflex to Free T4 if abnormal (Completed)    Hemoglobin A1C (Completed)     Comprehensive Metabolic Panel (Completed)    Abnormal glucose R73.09    Relevant Orders    Lipid Panel (Completed)    CBC (Completed)    TSH with reflex to Free T4 if abnormal (Completed)    Hemoglobin A1C (Completed)    Comprehensive Metabolic Panel (Completed)    Abdominal aortic aneurysm (AAA) without rupture (CMS-HCC) - Primary I71.40    Relevant Orders    Vascular US Aorta Iliac Duplex Complete    Lipid Panel (Completed)    CBC (Completed)    TSH with reflex to Free T4 if abnormal (Completed)    Hemoglobin A1C (Completed)    Comprehensive Metabolic Panel (Completed)    Essential (primary) hypertension I10    Relevant Orders    Vascular US Aorta Iliac Duplex Complete    Lipid Panel (Completed)    CBC (Completed)    TSH with reflex to Free T4 if abnormal (Completed)    Hemoglobin A1C (Completed)    Comprehensive Metabolic Panel (Completed)    Esophageal dysphagia R13.19    Relevant Medications    pantoprazole (ProtoNix) 40 mg EC tablet

## 2024-09-03 ENCOUNTER — OFFICE VISIT (OUTPATIENT)
Dept: GASTROENTEROLOGY | Facility: CLINIC | Age: 69
End: 2024-09-03
Payer: MEDICARE

## 2024-09-03 DIAGNOSIS — R13.19 ESOPHAGEAL DYSPHAGIA: Primary | ICD-10-CM

## 2024-09-03 DIAGNOSIS — K21.9 GASTROESOPHAGEAL REFLUX DISEASE WITHOUT ESOPHAGITIS: ICD-10-CM

## 2024-09-03 PROCEDURE — 1157F ADVNC CARE PLAN IN RCRD: CPT | Performed by: NURSE PRACTITIONER

## 2024-09-03 PROCEDURE — 99213 OFFICE O/P EST LOW 20 MIN: CPT | Performed by: NURSE PRACTITIONER

## 2024-09-03 PROCEDURE — 1160F RVW MEDS BY RX/DR IN RCRD: CPT | Performed by: NURSE PRACTITIONER

## 2024-09-03 PROCEDURE — 1159F MED LIST DOCD IN RCRD: CPT | Performed by: NURSE PRACTITIONER

## 2024-09-03 PROCEDURE — 1123F ACP DISCUSS/DSCN MKR DOCD: CPT | Performed by: NURSE PRACTITIONER

## 2024-09-03 RX ORDER — FAMOTIDINE 40 MG/1
40 TABLET, FILM COATED ORAL DAILY
Qty: 30 TABLET | Refills: 11 | Status: SHIPPED | OUTPATIENT
Start: 2024-09-03 | End: 2025-09-03

## 2024-09-03 ASSESSMENT — ENCOUNTER SYMPTOMS
APNEA: 0
STRIDOR: 0
ENDOCRINE NEGATIVE: 1
EYES NEGATIVE: 1
FATIGUE: 0
PSYCHIATRIC NEGATIVE: 1
ROS GI COMMENTS: SEE HPI
NEUROLOGICAL NEGATIVE: 1
RESPIRATORY NEGATIVE: 1
DIAPHORESIS: 0
CARDIOVASCULAR NEGATIVE: 1
DIFFICULTY URINATING: 0
ALLERGIC/IMMUNOLOGIC NEGATIVE: 1
MUSCULOSKELETAL NEGATIVE: 1
FEVER: 0
HEMATOLOGIC/LYMPHATIC NEGATIVE: 1
SHORTNESS OF BREATH: 0
CHEST TIGHTNESS: 0
COUGH: 0
CHILLS: 0
WHEEZING: 0

## 2024-09-03 NOTE — PROGRESS NOTES
Subjective   Patient ID: Tyrone Ma is a 69 y.o. male who presents for esophageal dysphagia  and Follow-up. PMH: HLD, obesity, ENRIQUE (CPAP). Last seen in GI in May.     Underwent in EGD on 6/18 which found Davalos's esophagus and obstructing Schatzki's ring.   Biopsy found intestinal metaplasia. Repeat EGD in 3 years.   Dilated   Esophagram showed mild narrowed appearance of the esophagus     No issues in the morning with fruit, eggs, nuts    Using Omeprazole daily.     Still has dysphagia to certain meats   No dentures  Barium swallow unremarkable     Dysphagia has improved, not 100 percent, still has problems with meats    Colonoscopy 2021-2 transverse colon polyps -TA    Family Hx: Denies a family hx of CRC, GI cancers, Davalos's     Review of Systems   Constitutional:  Negative for chills, diaphoresis, fatigue and fever.   HENT: Negative.     Eyes: Negative.    Respiratory: Negative.  Negative for apnea, cough, chest tightness, shortness of breath, wheezing and stridor.    Cardiovascular: Negative.    Gastrointestinal:         See HPI    Endocrine: Negative.    Genitourinary: Negative.  Negative for difficulty urinating.   Musculoskeletal: Negative.    Skin: Negative.    Allergic/Immunologic: Negative.    Neurological: Negative.    Hematological: Negative.    Psychiatric/Behavioral: Negative.         Objective   Physical Exam  Neurological:      Mental Status: He is alert.         Assessment/Plan   Diagnoses and all orders for this visit:  Esophageal dysphagia  GERD         70 yo male with  h/o sleep apnea (on CPAP), HLD presents today for follow-up. Last seen in May for dysphagia. At that time underwent an EGD and found to have a Schatzki's ring and Davalos's esophagus with intentional metaplasia, dilated. He will repeat EGD in 3 years for Davalos's surveillance. Continue Omeprazole. Still has intermittent dysphagia with meats. Esophagram showed mild tertiary contractions and mild narrowed of esophagus. Will  start famotidine. We discussed additional testing and he would hold-off for now.     Lilian Alva, TE-CNP 09/03/24 1:18 PM

## 2024-09-04 ENCOUNTER — LAB (OUTPATIENT)
Dept: LAB | Facility: LAB | Age: 69
End: 2024-09-04
Payer: MEDICARE

## 2024-09-04 DIAGNOSIS — E78.00 PURE HYPERCHOLESTEROLEMIA: ICD-10-CM

## 2024-09-04 DIAGNOSIS — I71.40 ABDOMINAL AORTIC ANEURYSM (AAA) WITHOUT RUPTURE, UNSPECIFIED PART (CMS-HCC): ICD-10-CM

## 2024-09-04 DIAGNOSIS — I10 ESSENTIAL (PRIMARY) HYPERTENSION: ICD-10-CM

## 2024-09-04 DIAGNOSIS — M62.81 GENERALIZED MUSCLE WEAKNESS: ICD-10-CM

## 2024-09-04 DIAGNOSIS — R73.09 ABNORMAL GLUCOSE: ICD-10-CM

## 2024-09-04 LAB
ALBUMIN SERPL BCP-MCNC: 4.2 G/DL (ref 3.4–5)
ALP SERPL-CCNC: 49 U/L (ref 33–136)
ALT SERPL W P-5'-P-CCNC: 18 U/L (ref 10–52)
ANION GAP SERPL CALC-SCNC: 11 MMOL/L (ref 10–20)
AST SERPL W P-5'-P-CCNC: 17 U/L (ref 9–39)
BILIRUB SERPL-MCNC: 0.8 MG/DL (ref 0–1.2)
BUN SERPL-MCNC: 17 MG/DL (ref 6–23)
CALCIUM SERPL-MCNC: 9.2 MG/DL (ref 8.6–10.3)
CHLORIDE SERPL-SCNC: 105 MMOL/L (ref 98–107)
CHOLEST SERPL-MCNC: 198 MG/DL (ref 0–199)
CHOLESTEROL/HDL RATIO: 2.8
CO2 SERPL-SCNC: 28 MMOL/L (ref 21–32)
CREAT SERPL-MCNC: 0.82 MG/DL (ref 0.5–1.3)
EGFRCR SERPLBLD CKD-EPI 2021: >90 ML/MIN/1.73M*2
ERYTHROCYTE [DISTWIDTH] IN BLOOD BY AUTOMATED COUNT: 12.6 % (ref 11.5–14.5)
EST. AVERAGE GLUCOSE BLD GHB EST-MCNC: 117 MG/DL
GLUCOSE SERPL-MCNC: 102 MG/DL (ref 74–99)
HBA1C MFR BLD: 5.7 %
HCT VFR BLD AUTO: 46.5 % (ref 41–52)
HDLC SERPL-MCNC: 70.7 MG/DL
HGB BLD-MCNC: 15.8 G/DL (ref 13.5–17.5)
LDLC SERPL CALC-MCNC: 119 MG/DL
MCH RBC QN AUTO: 30.4 PG (ref 26–34)
MCHC RBC AUTO-ENTMCNC: 34 G/DL (ref 32–36)
MCV RBC AUTO: 89 FL (ref 80–100)
NON HDL CHOLESTEROL: 127 MG/DL (ref 0–149)
NRBC BLD-RTO: 0 /100 WBCS (ref 0–0)
PLATELET # BLD AUTO: 206 X10*3/UL (ref 150–450)
POTASSIUM SERPL-SCNC: 4.5 MMOL/L (ref 3.5–5.3)
PROT SERPL-MCNC: 6.5 G/DL (ref 6.4–8.2)
RBC # BLD AUTO: 5.2 X10*6/UL (ref 4.5–5.9)
SODIUM SERPL-SCNC: 139 MMOL/L (ref 136–145)
TRIGL SERPL-MCNC: 44 MG/DL (ref 0–149)
TSH SERPL-ACNC: 1.53 MIU/L (ref 0.44–3.98)
VLDL: 9 MG/DL (ref 0–40)
WBC # BLD AUTO: 7.7 X10*3/UL (ref 4.4–11.3)

## 2024-09-04 PROCEDURE — 80053 COMPREHEN METABOLIC PANEL: CPT

## 2024-09-04 PROCEDURE — 80061 LIPID PANEL: CPT

## 2024-09-04 PROCEDURE — 84443 ASSAY THYROID STIM HORMONE: CPT

## 2024-09-04 PROCEDURE — 83036 HEMOGLOBIN GLYCOSYLATED A1C: CPT

## 2024-09-04 PROCEDURE — 36415 COLL VENOUS BLD VENIPUNCTURE: CPT

## 2024-09-04 PROCEDURE — 85027 COMPLETE CBC AUTOMATED: CPT

## 2024-09-07 PROBLEM — I10 ESSENTIAL (PRIMARY) HYPERTENSION: Status: ACTIVE | Noted: 2024-09-07

## 2024-09-07 PROBLEM — I71.40 ABDOMINAL AORTIC ANEURYSM (AAA) WITHOUT RUPTURE (CMS-HCC): Status: ACTIVE | Noted: 2024-09-07

## 2024-09-07 PROBLEM — R13.19 ESOPHAGEAL DYSPHAGIA: Status: ACTIVE | Noted: 2024-09-07

## 2024-10-09 ENCOUNTER — HOSPITAL ENCOUNTER (OUTPATIENT)
Dept: VASCULAR MEDICINE | Facility: HOSPITAL | Age: 69
Discharge: HOME | End: 2024-10-09
Payer: MEDICARE

## 2024-10-09 DIAGNOSIS — I72.3 ANEURYSM OF ILIAC ARTERY (CMS-HCC): ICD-10-CM

## 2024-10-09 DIAGNOSIS — I71.40 ABDOMINAL AORTIC ANEURYSM (AAA) WITHOUT RUPTURE, UNSPECIFIED PART (CMS-HCC): ICD-10-CM

## 2024-10-09 DIAGNOSIS — I10 ESSENTIAL (PRIMARY) HYPERTENSION: ICD-10-CM

## 2024-10-09 PROCEDURE — 93978 VASCULAR STUDY: CPT | Performed by: SURGERY

## 2024-10-09 PROCEDURE — 93978 VASCULAR STUDY: CPT

## 2024-10-18 DIAGNOSIS — I72.3 ILIAC ARTERY ANEURYSM, RIGHT (CMS-HCC): Primary | ICD-10-CM

## 2024-11-02 DIAGNOSIS — H91.90 HEARING LOSS, UNSPECIFIED HEARING LOSS TYPE, UNSPECIFIED LATERALITY: Primary | ICD-10-CM

## 2024-11-06 ENCOUNTER — OFFICE VISIT (OUTPATIENT)
Dept: VASCULAR SURGERY | Facility: CLINIC | Age: 69
End: 2024-11-06
Payer: MEDICARE

## 2024-11-06 VITALS
BODY MASS INDEX: 29.49 KG/M2 | SYSTOLIC BLOOD PRESSURE: 116 MMHG | OXYGEN SATURATION: 96 % | HEART RATE: 48 BPM | WEIGHT: 206 LBS | HEIGHT: 70 IN | DIASTOLIC BLOOD PRESSURE: 75 MMHG

## 2024-11-06 DIAGNOSIS — I72.3 ILIAC ARTERY ANEURYSM, RIGHT (CMS-HCC): ICD-10-CM

## 2024-11-06 PROCEDURE — 1157F ADVNC CARE PLAN IN RCRD: CPT | Performed by: SURGERY

## 2024-11-06 PROCEDURE — 99203 OFFICE O/P NEW LOW 30 MIN: CPT | Performed by: SURGERY

## 2024-11-06 PROCEDURE — 3074F SYST BP LT 130 MM HG: CPT | Performed by: SURGERY

## 2024-11-06 PROCEDURE — 99213 OFFICE O/P EST LOW 20 MIN: CPT | Performed by: SURGERY

## 2024-11-06 PROCEDURE — 1159F MED LIST DOCD IN RCRD: CPT | Performed by: SURGERY

## 2024-11-06 PROCEDURE — 1036F TOBACCO NON-USER: CPT | Performed by: SURGERY

## 2024-11-06 PROCEDURE — 1123F ACP DISCUSS/DSCN MKR DOCD: CPT | Performed by: SURGERY

## 2024-11-06 PROCEDURE — 3008F BODY MASS INDEX DOCD: CPT | Performed by: SURGERY

## 2024-11-06 PROCEDURE — 3078F DIAST BP <80 MM HG: CPT | Performed by: SURGERY

## 2024-11-06 ASSESSMENT — ENCOUNTER SYMPTOMS
APNEA: 0
NUMBNESS: 0
OCCASIONAL FEELINGS OF UNSTEADINESS: 0
DEPRESSION: 0
ABDOMINAL DISTENTION: 0
WOUND: 0
LOSS OF SENSATION IN FEET: 0
COLOR CHANGE: 0
WEAKNESS: 0
UNEXPECTED WEIGHT CHANGE: 0

## 2024-11-06 NOTE — PROGRESS NOTES
Vascular Surgery Consult/Clinic Note    CC: iliac artery ectasia.     HPI:  Tyrone Ma is 69 y.o. 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   He denies any abdominal, back or chest pain.   Denies any tobacco use.     Meds:   Current Outpatient Medications on File Prior to Visit   Medication Sig Dispense Refill    clindamycin (Cleocin T) 1 % lotion Apply topically 2 times a day. 60 mL 11    famotidine (Pepcid) 40 mg tablet Take 1 tablet (40 mg) by mouth once daily. 30 tablet 11    ketoconazole (NIZOral) 2 % cream As directed      pantoprazole (ProtoNix) 40 mg EC tablet Take 1 tablet (40 mg) by mouth once daily. Do not crush, chew, or split. 90 tablet 3    rosuvastatin (Crestor) 10 mg tablet Take 1 tablet (10 mg) by mouth once daily. 90 tablet 3     No current facility-administered medications on file prior to visit.        Allergies:   No Known Allergies    SH:    Social Drivers of Health     Tobacco Use: Low Risk  (11/6/2024)    Patient History     Smoking Tobacco Use: Never     Smokeless Tobacco Use: Never     Passive Exposure: Never   Alcohol Use: Not At Risk (4/17/2024)    AUDIT-C     Frequency of Alcohol Consumption: Never     Average Number of Drinks: Patient does not drink     Frequency of Binge Drinking: Never   Financial Resource Strain: Low Risk  (6/18/2024)    Overall Financial Resource Strain (CARDIA)     Difficulty of Paying Living Expenses: Not hard at all   Food Insecurity: No Food Insecurity (10/18/2023)    Hunger Vital Sign     Worried About Running Out of Food in the Last Year: Never true     Ran Out of Food in the Last Year: Never true   Transportation Needs: No Transportation Needs (6/18/2024)    PRAPARE - Transportation     Lack of Transportation (Medical): No     Lack of Transportation (Non-Medical): No   Physical Activity: Patient Declined (6/18/2024)    Exercise Vital Sign     Days of Exercise per Week: Patient declined     Minutes of  Exercise per Session: Patient declined   Stress: No Stress Concern Present (6/18/2024)    [employer] - Occupational Stress Questionnaire     Feeling of Stress : Not at all   Social Connections: Unknown (6/18/2024)    Social Connection and Isolation Panel [NHANES]     Frequency of Communication with Friends and Family: More than three times a week     Frequency of Social Gatherings with Friends and Family: More than three times a week     Attends Taoist Services: Patient declined     Active Member of Clubs or Organizations: Patient declined     Attends Club or Organization Meetings: Patient declined     Marital Status:    Intimate Partner Violence: Not At Risk (6/18/2024)    Humiliation, Afraid, Rape, and Kick questionnaire     Fear of Current or Ex-Partner: No     Emotionally Abused: No     Physically Abused: No     Sexually Abused: No   Depression: Not at risk (8/29/2024)    PHQ-2     PHQ-2 Score: 0   Housing Stability: Low Risk  (6/18/2024)    Housing Stability Vital Sign     Unable to Pay for Housing in the Last Year: No     Number of Places Lived in the Last Year: 1     Unstable Housing in the Last Year: No   Utilities: Not At Risk (6/18/2024)    [facility] Utilities     Threatened with loss of utilities: No   Digital Equity: Not At Risk (6/18/2024)    Digital Health Equity Screening     Access to device/internet: Desktop computer, laptop computer, or tablet with broadband internet connection;Smartphone with cellular data plan     Requested support: Computer;Broadband internet subscription;Digital/internet skills training   Health Literacy: Not on file        FH:  Family History   Problem Relation Name Age of Onset    Heart attack Mother Stanton     Stomach cancer Mother Grace     Hypertension Father Tedted     Heart failure Father Tedted         ROS:  Review of Systems   Constitutional:  Negative for unexpected weight change.   HENT:  Negative for congestion.    Eyes:  Negative  for visual disturbance.   Respiratory:  Negative for apnea.    Cardiovascular:  Negative for chest pain.   Gastrointestinal:  Negative for abdominal distention.   Skin:  Negative for color change and wound.   Neurological:  Negative for weakness and numbness.        Objective:  Vitals:  There were no vitals filed for this visit.     Exam:  Gen: in NAD  GI: Soft, ND/NT  Ext:  BLE with 5/5 motor str.   BLE without tissue loss.   B DP with palpable pulses.     Imaging:  Aortoiliac duplex (10/9/2024) independently reviewed.   Abdominal aorta 2.54 cm  R CANELO approx. 1.83  L CANELO approx. 1.27      Tyrone Ma is 69 y.o. 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    - Cont. Statin therapy and BP control.    - Follow up in 1 year with aorto iliac duplex for surveillance.     Pedro Coronado MD, PhD  Clinical   [facility] [employer]  Co-Director, Aortic Center  [facility] [place]

## 2024-11-11 NOTE — PROGRESS NOTES
Subjective    Patient ID: Tyrone Ma is a 69 y.o. male.    Chief Complaint: No chief complaint on file.     Last Surgery: No surgery found  Last Surgery Date: No surgery found    HPI  The patient is a pleasant 68 year-old right hand dominant male presenting today for evaluation of their right shoulder. He is presenting today with complaints of shoulder pain that has been progressively worsening. He has had the pain for the last 3-4 years. He does not feel that the pain is terrible, but his wife says he complains about it often. He has not tried injections yet but he has done therapy. He did not have success with therapy. He notes that his left shoulder is beginning to hurt.      11/07/23  Tyrone returns to the clinic today for a repeat follow up visit regarding his right shoulder.     He explains today that the injections are helping. He is hoping to have a repeat done today.     02/06/24  Tyrone returns to the clinic today for a follow up visit regarding his right shoulder.     He reports that the injections are continuing to take the edge off his shoulder pain.     05/07/24  Tyrone returns to the clinic today for a repeat follow up visit regarding his right shoulder.     He reports that injections are still beneficial. He notices when his injection is about to wear off as he starts to hurt.     08/13/24  Tyrone Ma is a 69 y.o. male returning to the clinic for a repeat follow up visit regarding his right shoulder.    He reports that his last injection just started wearing off about 10 days ago.      11/12/24  Tyrone Ma is a 69 y.o. male returning to the clinic for a repeat follow up visit regarding his right shoulder.     He returns today reporting that his shoulder is doing fair today. Injections continue to be helpful and he would like to continue these for now. They almost last him the full 3 months.      Past medical history, surgical history, social history, and family history were all reviewed  and are as per the [place] patient health history questionnaire form that I signed and scanned into the chart today.    Objective   Patient is a well-developed, well-nourished male in no acute distress. Breathes with normal chest rises. Pupils are round and symmetric today. Awake, alert, and oriented x3.     Examination of the left shoulder today reveals the skin to be intact. There is no sign any atrophy lesions or abrasions. There is no pain to palpation of the bony prominences. Cervical lymphadenopathy examined, and this was negative.   Patient had 5 out of 5 wrist flexors extension, thumb extension bilaterally. Sensation was intact to light touch to median, ulnar, radial axillary, and musculocutaneous nerves bilaterally. Positive radial pulse bilaterally. Provocative maneuvers on the left side today were negative.   Range of motion of the left shoulder revealed 0-170° of forward elevation. 0-45° of external rotation. Internal rotation was to T 12.      Examination of the right shoulder today reveals the skin to be intact. There is no sign any atrophy lesions or abrasions. There is no pain to palpation of the bony prominences. Cervical lymphadenopathy examined, and this was negative.   Patient had 5 out of 5 wrist flexors extension, thumb extension, bilaterally. Sensation was intact to light touch to median, ulnar, radial, axillary, and musculocutaneous nerves bilaterally. Positive radial pulse bilaterally. Provocative maneuvers on the right side today were negative.   Range of motion of the right shoulder revealed 0-130° of forward elevation, passively correctable to 17. 0-35° of external rotation. And internal rotation up to T 12. Negative modified belly press test      Image Results:    L Inj/Asp: R glenohumeral on 11/12/2024 3:57 PM  Indications: pain  Details: 20 G needle, anterior approach  Medications: 40 mg triamcinolone acetonide 40 mg/mL; 4 mL lidocaine 10 mg/mL (1 %)  Outcome: tolerated well, no immediate  complications  Procedure, treatment alternatives, risks and benefits explained, specific risks discussed. Consent was given by the patient. Immediately prior to procedure a time out was called to verify the correct patient, procedure, equipment, support staff and site/side marked as required. Patient was prepped and draped in the usual sterile fashion.             Assessment/Plan   Encounter Diagnoses:  No diagnosis found.  Patient with endstage arthritis of the right shoulder     At this time we had a long discussion about the various options for shoulder arthritis.      1. Do nothing and continue activity modifications.   2. Consider cortisone injections into the glenohumeral joint. This would give pain relief that is temporary. This would not stop the progression of arthritis. For some patients, this injection can provide no relief at all, relief for 2 weeks, 4 weeks, 3 months or longer. The risk of the injection is fairly minimal but does include included a very small chance of infection.   3. Option is a total shoulder replacement. This will give the patient a more permanent solution to pain relief. It would also improve function. There is no rush to this procedure it is an elective procedure.     He would like to proceed with an injection today for their RIGHT shoulder today. They tolerated the RIGHT shoulder injection very well today. We will see how they do after the injection. They are aware that there can be a spike in glucose levels following an injection.    At this time, we will also have the patient complete out-patient physical therapy as part of the conservative management of their shoulder arthritis. They were given a prescription for this today. They were also given our provider directed home exercise program, along with correlating website for home therapy. The therapy should be performed 2-3 times a day and should take about 10-15 minutes to perform each time.     If they do not get sustained  relief from the injection and therapy, consideration for a repeat injection can be made versus advanced imaging for preoperative planning. Again, there is no rush to surgery intervention.       No orders of the defined types were placed in this encounter.    Follow up in 3 months    Scribe Attestation  By signing my name below, I, Baltazar Roa   attest that this documentation has been prepared under the direction and in the presence of Victor Hugo Somers MD.

## 2024-11-12 ENCOUNTER — APPOINTMENT (OUTPATIENT)
Dept: ORTHOPEDIC SURGERY | Facility: HOSPITAL | Age: 69
End: 2024-11-12
Payer: MEDICARE

## 2024-11-12 DIAGNOSIS — M25.511 RIGHT SHOULDER PAIN, UNSPECIFIED CHRONICITY: Primary | ICD-10-CM

## 2024-11-12 PROCEDURE — 1123F ACP DISCUSS/DSCN MKR DOCD: CPT | Performed by: ORTHOPAEDIC SURGERY

## 2024-11-12 PROCEDURE — 20610 DRAIN/INJ JOINT/BURSA W/O US: CPT | Mod: RT | Performed by: ORTHOPAEDIC SURGERY

## 2024-11-12 PROCEDURE — 2500000004 HC RX 250 GENERAL PHARMACY W/ HCPCS (ALT 636 FOR OP/ED): Performed by: ORTHOPAEDIC SURGERY

## 2024-11-12 PROCEDURE — 99214 OFFICE O/P EST MOD 30 MIN: CPT | Performed by: ORTHOPAEDIC SURGERY

## 2024-11-12 PROCEDURE — 1157F ADVNC CARE PLAN IN RCRD: CPT | Performed by: ORTHOPAEDIC SURGERY

## 2024-11-12 RX ADMIN — LIDOCAINE HYDROCHLORIDE 4 ML: 10 INJECTION, SOLUTION INFILTRATION; PERINEURAL at 15:57

## 2024-11-12 RX ADMIN — TRIAMCINOLONE ACETONIDE 40 MG: 200 INJECTION, SUSPENSION INTRA-ARTICULAR; INTRAMUSCULAR at 15:57

## 2024-11-17 RX ORDER — TRIAMCINOLONE ACETONIDE 40 MG/ML
40 INJECTION, SUSPENSION INTRA-ARTICULAR; INTRAMUSCULAR
Status: COMPLETED | OUTPATIENT
Start: 2024-11-12 | End: 2024-11-12

## 2024-11-17 RX ORDER — LIDOCAINE HYDROCHLORIDE 10 MG/ML
4 INJECTION, SOLUTION INFILTRATION; PERINEURAL
Status: COMPLETED | OUTPATIENT
Start: 2024-11-12 | End: 2024-11-12

## 2025-02-09 NOTE — PROGRESS NOTES
Subjective    Patient ID: Tyrone Ma is a 69 y.o. male.    Chief Complaint: No chief complaint on file.     Last Surgery: No surgery found  Last Surgery Date: No surgery found    HPI  The patient is a pleasant 68 year-old right hand dominant male presenting today for evaluation of their right shoulder. He is presenting today with complaints of shoulder pain that has been progressively worsening. He has had the pain for the last 3-4 years. He does not feel that the pain is terrible, but his wife says he complains about it often. He has not tried injections yet but he has done therapy. He did not have success with therapy. He notes that his left shoulder is beginning to hurt.      11/07/23  Tyrone returns to the clinic today for a repeat follow up visit regarding his right shoulder.     He explains today that the injections are helping. He is hoping to have a repeat done today.     02/06/24  Tyrone returns to the clinic today for a follow up visit regarding his right shoulder.     He reports that the injections are continuing to take the edge off his shoulder pain.     05/07/24  Tyrone returns to the clinic today for a repeat follow up visit regarding his right shoulder.     He reports that injections are still beneficial. He notices when his injection is about to wear off as he starts to hurt.     08/13/24  Tyrone Ma is a 69 y.o. male returning to the clinic for a repeat follow up visit regarding his right shoulder.    He reports that his last injection just started wearing off about 10 days ago.      11/12/24  Tyrone Ma is a 69 y.o. male returning to the clinic for a repeat follow up visit regarding his right shoulder.     He returns today reporting that his shoulder is doing fair today. Injections continue to be helpful and he would like to continue these for now. They almost last him the full 3 months.     2/11/25  Tyrone Ma is a 69 y.o. male returning to the clinic for a follow up visit regarding  his right shoulder.    He is here today for a repeat injection. They continue to provide him with sustained relief.      Past medical history, surgical history, social history, and family history were all reviewed and are as per the [place] patient health history questionnaire form that I signed and scanned into the chart today.    Objective   Patient is a well-developed, well-nourished male in no acute distress. Breathes with normal chest rises. Pupils are round and symmetric today. Awake, alert, and oriented x3.     Examination of the left shoulder today reveals the skin to be intact. There is no sign any atrophy lesions or abrasions. There is no pain to palpation of the bony prominences. Cervical lymphadenopathy examined, and this was negative.   Patient had 5 out of 5 wrist flexors extension, thumb extension bilaterally. Sensation was intact to light touch to median, ulnar, radial axillary, and musculocutaneous nerves bilaterally. Positive radial pulse bilaterally. Provocative maneuvers on the left side today were negative.   Range of motion of the left shoulder revealed 0-170° of forward elevation. 0-45° of external rotation. Internal rotation was to T 12.      Examination of the right shoulder today reveals the skin to be intact. There is no sign any atrophy lesions or abrasions. There is no pain to palpation of the bony prominences. Cervical lymphadenopathy examined, and this was negative.   Patient had 5 out of 5 wrist flexors extension, thumb extension, bilaterally. Sensation was intact to light touch to median, ulnar, radial, axillary, and musculocutaneous nerves bilaterally. Positive radial pulse bilaterally. Provocative maneuvers on the right side today were negative.   Range of motion of the right shoulder revealed 0-130° of forward elevation, passively correctable to 17. 0-35° of external rotation. And internal rotation up to T 12. Negative modified belly press test      Image Results:    L Inj/Asp: R  glenohumeral on 2/11/2025 8:22 AM  Indications: pain  Details: 20 G needle, anterior approach  Medications: 40 mg triamcinolone acetonide 40 mg/mL; 4 mL lidocaine 10 mg/mL (1 %)  Outcome: tolerated well, no immediate complications  Procedure, treatment alternatives, risks and benefits explained, specific risks discussed. Consent was given by the patient. Immediately prior to procedure a time out was called to verify the correct patient, procedure, equipment, support staff and site/side marked as required. Patient was prepped and draped in the usual sterile fashion.             Assessment/Plan   Encounter Diagnoses:  No diagnosis found.  Patient with endstage arthritis of the right shoulder     At this time we had a long discussion about the various options for shoulder arthritis.      1. Do nothing and continue activity modifications.   2. Consider cortisone injections into the glenohumeral joint. This would give pain relief that is temporary. This would not stop the progression of arthritis. For some patients, this injection can provide no relief at all, relief for 2 weeks, 4 weeks, 3 months or longer. The risk of the injection is fairly minimal but does include included a very small chance of infection.   3. Option is a total shoulder replacement. This will give the patient a more permanent solution to pain relief. It would also improve function. There is no rush to this procedure it is an elective procedure.     He would like to proceed with an injection today for their RIGHT shoulder today. They tolerated the RIGHT shoulder injection very well today. We will see how they do after the injection. They are aware that there can be a spike in glucose levels following an injection.    At this time, we will also have the patient complete out-patient physical therapy as part of the conservative management of their shoulder arthritis. They were given a prescription for this today. They were also given our provider  directed home exercise program, along with correlating website for home therapy. The therapy should be performed 2-3 times a day and should take about 10-15 minutes to perform each time.     If they do not get sustained relief from the injection and therapy, consideration for a repeat injection can be made versus advanced imaging for preoperative planning. Again, there is no rush to surgery intervention.       No orders of the defined types were placed in this encounter.    Follow up in 3 months    Scribe Attestation  By signing my name below, I, Baltazar Roa   attest that this documentation has been prepared under the direction and in the presence of Victor Hugo Somers MD.

## 2025-02-11 ENCOUNTER — APPOINTMENT (OUTPATIENT)
Dept: ORTHOPEDIC SURGERY | Facility: HOSPITAL | Age: 70
End: 2025-02-11
Payer: MEDICARE

## 2025-02-11 ENCOUNTER — OFFICE VISIT (OUTPATIENT)
Dept: ORTHOPEDIC SURGERY | Facility: HOSPITAL | Age: 70
End: 2025-02-11
Payer: MEDICARE

## 2025-02-11 DIAGNOSIS — M25.511 RIGHT SHOULDER PAIN, UNSPECIFIED CHRONICITY: Primary | ICD-10-CM

## 2025-02-11 PROCEDURE — 1157F ADVNC CARE PLAN IN RCRD: CPT | Performed by: ORTHOPAEDIC SURGERY

## 2025-02-11 PROCEDURE — 1123F ACP DISCUSS/DSCN MKR DOCD: CPT | Performed by: ORTHOPAEDIC SURGERY

## 2025-02-11 PROCEDURE — 2500000004 HC RX 250 GENERAL PHARMACY W/ HCPCS (ALT 636 FOR OP/ED): Performed by: ORTHOPAEDIC SURGERY

## 2025-02-11 PROCEDURE — 99213 OFFICE O/P EST LOW 20 MIN: CPT | Mod: 25 | Performed by: ORTHOPAEDIC SURGERY

## 2025-02-11 PROCEDURE — 99213 OFFICE O/P EST LOW 20 MIN: CPT | Performed by: ORTHOPAEDIC SURGERY

## 2025-02-11 PROCEDURE — 20610 DRAIN/INJ JOINT/BURSA W/O US: CPT | Mod: RT | Performed by: ORTHOPAEDIC SURGERY

## 2025-02-11 RX ADMIN — LIDOCAINE HYDROCHLORIDE 4 ML: 10 INJECTION, SOLUTION INFILTRATION; PERINEURAL at 08:22

## 2025-02-11 RX ADMIN — TRIAMCINOLONE ACETONIDE 40 MG: 200 INJECTION, SUSPENSION INTRA-ARTICULAR; INTRAMUSCULAR at 08:22

## 2025-02-13 ENCOUNTER — APPOINTMENT (OUTPATIENT)
Dept: PRIMARY CARE | Facility: CLINIC | Age: 70
End: 2025-02-13
Payer: MEDICARE

## 2025-02-13 VITALS
BODY MASS INDEX: 31.61 KG/M2 | SYSTOLIC BLOOD PRESSURE: 146 MMHG | DIASTOLIC BLOOD PRESSURE: 78 MMHG | HEART RATE: 60 BPM | WEIGHT: 220.8 LBS | HEIGHT: 70 IN

## 2025-02-13 DIAGNOSIS — Z12.5 PROSTATE CANCER SCREENING: ICD-10-CM

## 2025-02-13 DIAGNOSIS — G47.33 OBSTRUCTIVE SLEEP APNEA: ICD-10-CM

## 2025-02-13 DIAGNOSIS — Z00.00 ROUTINE GENERAL MEDICAL EXAMINATION AT HEALTH CARE FACILITY: Primary | ICD-10-CM

## 2025-02-13 DIAGNOSIS — K21.9 GASTROESOPHAGEAL REFLUX DISEASE WITHOUT ESOPHAGITIS: ICD-10-CM

## 2025-02-13 DIAGNOSIS — I10 ESSENTIAL (PRIMARY) HYPERTENSION: ICD-10-CM

## 2025-02-13 DIAGNOSIS — M19.011 LOCALIZED OSTEOARTHRITIS OF RIGHT SHOULDER: ICD-10-CM

## 2025-02-13 DIAGNOSIS — E78.00 PURE HYPERCHOLESTEROLEMIA: ICD-10-CM

## 2025-02-13 DIAGNOSIS — Z00.00 ROUTINE GENERAL MEDICAL EXAMINATION AT A HEALTH CARE FACILITY: ICD-10-CM

## 2025-02-13 PROCEDURE — 99214 OFFICE O/P EST MOD 30 MIN: CPT | Performed by: FAMILY MEDICINE

## 2025-02-13 PROCEDURE — 3008F BODY MASS INDEX DOCD: CPT | Performed by: FAMILY MEDICINE

## 2025-02-13 PROCEDURE — 1170F FXNL STATUS ASSESSED: CPT | Performed by: FAMILY MEDICINE

## 2025-02-13 PROCEDURE — 1123F ACP DISCUSS/DSCN MKR DOCD: CPT | Performed by: FAMILY MEDICINE

## 2025-02-13 PROCEDURE — G0439 PPPS, SUBSEQ VISIT: HCPCS | Performed by: FAMILY MEDICINE

## 2025-02-13 PROCEDURE — 3077F SYST BP >= 140 MM HG: CPT | Performed by: FAMILY MEDICINE

## 2025-02-13 PROCEDURE — G0446 INTENS BEHAVE THER CARDIO DX: HCPCS | Performed by: FAMILY MEDICINE

## 2025-02-13 PROCEDURE — 1157F ADVNC CARE PLAN IN RCRD: CPT | Performed by: FAMILY MEDICINE

## 2025-02-13 PROCEDURE — 3078F DIAST BP <80 MM HG: CPT | Performed by: FAMILY MEDICINE

## 2025-02-13 PROCEDURE — 1159F MED LIST DOCD IN RCRD: CPT | Performed by: FAMILY MEDICINE

## 2025-02-13 RX ORDER — LIDOCAINE HYDROCHLORIDE 10 MG/ML
4 INJECTION, SOLUTION INFILTRATION; PERINEURAL
Status: COMPLETED | OUTPATIENT
Start: 2025-02-11 | End: 2025-02-11

## 2025-02-13 RX ORDER — TRIAMCINOLONE ACETONIDE 40 MG/ML
40 INJECTION, SUSPENSION INTRA-ARTICULAR; INTRAMUSCULAR
Status: COMPLETED | OUTPATIENT
Start: 2025-02-11 | End: 2025-02-11

## 2025-02-13 ASSESSMENT — ACTIVITIES OF DAILY LIVING (ADL)
GROCERY_SHOPPING: INDEPENDENT
DOING_HOUSEWORK: INDEPENDENT
TAKING_MEDICATION: INDEPENDENT
MANAGING_FINANCES: INDEPENDENT
BATHING: INDEPENDENT
DRESSING: INDEPENDENT

## 2025-02-13 ASSESSMENT — ENCOUNTER SYMPTOMS
OCCASIONAL FEELINGS OF UNSTEADINESS: 0
LOSS OF SENSATION IN FEET: 0
DEPRESSION: 0

## 2025-02-13 ASSESSMENT — PATIENT HEALTH QUESTIONNAIRE - PHQ9
2. FEELING DOWN, DEPRESSED OR HOPELESS: NOT AT ALL
SUM OF ALL RESPONSES TO PHQ9 QUESTIONS 1 AND 2: 0
1. LITTLE INTEREST OR PLEASURE IN DOING THINGS: NOT AT ALL

## 2025-02-13 NOTE — ASSESSMENT & PLAN NOTE
Orders:    Lipid Panel; Future    CBC; Future    TSH with reflex to Free T4 if abnormal; Future    Hemoglobin A1C; Future    Comprehensive Metabolic Panel; Future    Prostate Specific Antigen, Screen; Future

## 2025-02-13 NOTE — ASSESSMENT & PLAN NOTE
Orders:    tirzepatide, weight loss, (Zepbound) 2.5 mg/0.5 mL injection; Inject 2.5 mg under the skin every 7 days.

## 2025-02-13 NOTE — PROGRESS NOTES
"Subjective   Reason for Visit: Tyrone Ma is an 69 y.o. male here for a Medicare Wellness visit.               HPI  Patient is here for follow-up of hypertension hyperlipidemia has been taking meds as prescribed denies chest pain shortness of breath current allergies myalgias dizziness been eating healthy and trying to increase exercise  Has sleep apnea uring CPAP  Has had a hard time losinh weight  Patient Care Team:  Giancarlo Sol MD as PCP - General  Giancarlo Sol MD as PCP - [employer] PCP     Review of Systems   Constitutional:  Negative for chills and fever.   HENT: Negative.     Respiratory: Negative.     Cardiovascular: Negative.    Gastrointestinal: Negative.  Negative for nausea and vomiting.   Endocrine: Negative.    Genitourinary: Negative.    Musculoskeletal:  Positive for arthralgias and myalgias.   Skin: Negative.  Negative for rash.   Allergic/Immunologic: Negative.    Neurological: Negative.    Hematological: Negative.    Psychiatric/Behavioral: Negative.     All other systems reviewed and are negative.      Objective   Vitals:  /78   Pulse 60   Ht 1.778 m (5' 10\")   Wt 100 kg (220 lb 12.8 oz)   BMI 31.68 kg/m²       Physical Exam  Vitals and nursing note reviewed.   Constitutional:       Appearance: Normal appearance.   HENT:      Head: Normocephalic and atraumatic.      Right Ear: Tympanic membrane normal.      Left Ear: Tympanic membrane normal.      Nose: Nose normal.      Mouth/Throat:      Mouth: Mucous membranes are moist.   Eyes:      Pupils: Pupils are equal, round, and reactive to light.   Cardiovascular:      Rate and Rhythm: Normal rate and regular rhythm.      Pulses: Normal pulses.      Heart sounds: Normal heart sounds.   Pulmonary:      Effort: Pulmonary effort is normal.      Breath sounds: Normal breath sounds.   Abdominal:      General: Abdomen is flat. Bowel sounds are normal.      Palpations: Abdomen is soft.   Musculoskeletal:         General: " Normal range of motion.      Cervical back: Normal range of motion and neck supple.   Skin:     General: Skin is warm and dry.      Capillary Refill: Capillary refill takes less than 2 seconds.   Neurological:      General: No focal deficit present.      Mental Status: He is alert and oriented to person, place, and time.   Psychiatric:         Mood and Affect: Mood normal.         Assessment & Plan  BMI 32.0-32.9,adult  Watch what you eat and include more vegetables on your plate.  Portion control and exercise will help in loosing weight .   It is recommended to get 150 mins of brisk exercise in a week . 150 mins of exercise per week will help in maintaining your current weight, if you are already working out . Exercise should make your heart rate go up.   Calorie deficit ( spending more calories than eating ) will result in weight loss    A deficit of 500 calories per day in 7 days would results in 1lbs weight loss per week., Aim for 1-2 lbs weight loss per month.   You can reduce the calorie intake by 250 calories and spend 250 calories by working out which would give you a total deficit of 500 calories     No sugary/ sweetened beverages , portion control , dedicated physical activity atleast 5 times a week advised .     Orders:    tirzepatide, weight loss, (Zepbound) 2.5 mg/0.5 mL injection; Inject 2.5 mg under the skin every 7 days.    Obstructive sleep apnea    Orders:    tirzepatide, weight loss, (Zepbound) 2.5 mg/0.5 mL injection; Inject 2.5 mg under the skin every 7 days.    Routine general medical examination at a health care facility    Orders:    Lipid Panel; Future    CBC; Future    TSH with reflex to Free T4 if abnormal; Future    Hemoglobin A1C; Future    Comprehensive Metabolic Panel; Future    Prostate Specific Antigen, Screen; Future    Prostate cancer screening    Orders:    Prostate Specific Antigen, Screen; Future    Localized osteoarthritis of right shoulder    Orders:    CBC;  Future    Gastroesophageal reflux disease without esophagitis    Orders:    CBC; Future    Routine general medical examination at health care facility         Essential (primary) hypertension  What is High Blood Pressure? High blood pressure (also called hypertension) is when your blood moves through your arteries at a higher pressure than normal and that forces your heart to work harder to pump the blood.     What are the Complications of High Blood Pressure? When your blood pressure gets too high or stays high for a long time, it can cause health problems such as:    Kidney disease or kidney failure   Heart attack and heart failure   Stroke   Vision problems   Circulation problems, poor blood supply to the legs    How are the causes of High Blood Pressure? There are many different causes and your provider can help you find out what might be causing your pressure to be too high.     What are the Signs of High Blood Pressure? High blood pressure doesn’t usually have warning signs or symptoms, so many people don’t realize they have it and that is why regular monitoring is important.     Prevention and Treatment: Often high blood pressure can be controlled with lifestyle changes and when necessary, medications. Adopting heart healthy habits can help:      Maintain a Healthy Weight   Eat a heart healthy diet full of vegetables, fruits and whole grains that are high in fiber   Be Physically Active every day   Find heart healthy ways to reduce stress and increase relaxation    Don’t use tobacco products   Limit your intake of alcohol, caffeine and salt   Monitor your blood pressure regularly: ask your provider how often   Take your medications as prescribed, even when you’re feeling well            Pure hypercholesterolemia  Continue medication check labs continue healthy eating                Cardiac Risk Assessment  15 - 20 minutes were spent discussing Cardiovascular risk and, if needed, lifestyle modifications were  recommended, including nutritional choices, exercise, and elimination of habits contributing to risk.   Aspirin use/disuse was discussed following the guidelines below:  low dose ASA ( mg) should be considered:    If prior Heart Attack/Stroke/Peripheral vascular disease:  Generally recommend daily low dose aspirin unless extremely high bleeding risk (e.g., gastrointestinal).    If no prior Heart Attack/Stroke/Peripheral vascular disease:              Age over 70: Do not use Aspirin for prevention    Age less than 70 and 10-year cardiovascular disease risk is >20%: use low dose Aspirin for prevention.

## 2025-02-13 NOTE — ASSESSMENT & PLAN NOTE
Watch what you eat and include more vegetables on your plate.  Portion control and exercise will help in loosing weight .   It is recommended to get 150 mins of brisk exercise in a week . 150 mins of exercise per week will help in maintaining your current weight, if you are already working out . Exercise should make your heart rate go up.   Calorie deficit ( spending more calories than eating ) will result in weight loss    A deficit of 500 calories per day in 7 days would results in 1lbs weight loss per week., Aim for 1-2 lbs weight loss per month.   You can reduce the calorie intake by 250 calories and spend 250 calories by working out which would give you a total deficit of 500 calories     No sugary/ sweetened beverages , portion control , dedicated physical activity atleast 5 times a week advised .     Orders:    tirzepatide, weight loss, (Zepbound) 2.5 mg/0.5 mL injection; Inject 2.5 mg under the skin every 7 days.

## 2025-02-16 ASSESSMENT — ENCOUNTER SYMPTOMS
ALLERGIC/IMMUNOLOGIC NEGATIVE: 1
ENDOCRINE NEGATIVE: 1
NAUSEA: 0
CHILLS: 0
VOMITING: 0
ARTHRALGIAS: 1
HEMATOLOGIC/LYMPHATIC NEGATIVE: 1
CARDIOVASCULAR NEGATIVE: 1
NEUROLOGICAL NEGATIVE: 1
RESPIRATORY NEGATIVE: 1
GASTROINTESTINAL NEGATIVE: 1
FEVER: 0
PSYCHIATRIC NEGATIVE: 1
MYALGIAS: 1

## 2025-02-16 NOTE — ASSESSMENT & PLAN NOTE
What is High Blood Pressure? High blood pressure (also called hypertension) is when your blood moves through your arteries at a higher pressure than normal and that forces your heart to work harder to pump the blood.     What are the Complications of High Blood Pressure? When your blood pressure gets too high or stays high for a long time, it can cause health problems such as:    Kidney disease or kidney failure   Heart attack and heart failure   Stroke   Vision problems   Circulation problems, poor blood supply to the legs    How are the causes of High Blood Pressure? There are many different causes and your provider can help you find out what might be causing your pressure to be too high.     What are the Signs of High Blood Pressure? High blood pressure doesn’t usually have warning signs or symptoms, so many people don’t realize they have it and that is why regular monitoring is important.     Prevention and Treatment: Often high blood pressure can be controlled with lifestyle changes and when necessary, medications. Adopting heart healthy habits can help:      Maintain a Healthy Weight   Eat a heart healthy diet full of vegetables, fruits and whole grains that are high in fiber   Be Physically Active every day   Find heart healthy ways to reduce stress and increase relaxation    Don’t use tobacco products   Limit your intake of alcohol, caffeine and salt   Monitor your blood pressure regularly: ask your provider how often   Take your medications as prescribed, even when you’re feeling well

## 2025-02-20 ENCOUNTER — TELEPHONE (OUTPATIENT)
Dept: PRIMARY CARE | Facility: CLINIC | Age: 70
End: 2025-02-20
Payer: MEDICARE

## 2025-02-22 LAB
ALBUMIN SERPL-MCNC: 4 G/DL (ref 3.6–5.1)
ALP SERPL-CCNC: 43 U/L (ref 35–144)
ALT SERPL-CCNC: 21 U/L (ref 9–46)
ANION GAP SERPL CALCULATED.4IONS-SCNC: 7 MMOL/L (CALC) (ref 7–17)
AST SERPL-CCNC: 18 U/L (ref 10–35)
BILIRUB SERPL-MCNC: 0.7 MG/DL (ref 0.2–1.2)
BUN SERPL-MCNC: 18 MG/DL (ref 7–25)
CALCIUM SERPL-MCNC: 8.7 MG/DL (ref 8.6–10.3)
CHLORIDE SERPL-SCNC: 103 MMOL/L (ref 98–110)
CHOLEST SERPL-MCNC: 150 MG/DL
CHOLEST/HDLC SERPL: 2.3 (CALC)
CO2 SERPL-SCNC: 28 MMOL/L (ref 20–32)
CREAT SERPL-MCNC: 0.74 MG/DL (ref 0.7–1.28)
EGFRCR SERPLBLD CKD-EPI 2021: 97 ML/MIN/1.73M2
ERYTHROCYTE [DISTWIDTH] IN BLOOD BY AUTOMATED COUNT: 12.6 % (ref 11–15)
EST. AVERAGE GLUCOSE BLD GHB EST-MCNC: 120 MG/DL
EST. AVERAGE GLUCOSE BLD GHB EST-SCNC: 6.6 MMOL/L
GLUCOSE SERPL-MCNC: 97 MG/DL (ref 65–99)
HBA1C MFR BLD: 5.8 % OF TOTAL HGB
HCT VFR BLD AUTO: 44.8 % (ref 38.5–50)
HDLC SERPL-MCNC: 65 MG/DL
HGB BLD-MCNC: 15 G/DL (ref 13.2–17.1)
LDLC SERPL CALC-MCNC: 75 MG/DL (CALC)
MCH RBC QN AUTO: 30.1 PG (ref 27–33)
MCHC RBC AUTO-ENTMCNC: 33.5 G/DL (ref 32–36)
MCV RBC AUTO: 89.8 FL (ref 80–100)
NONHDLC SERPL-MCNC: 85 MG/DL (CALC)
PLATELET # BLD AUTO: 249 THOUSAND/UL (ref 140–400)
PMV BLD REES-ECKER: 11.7 FL (ref 7.5–12.5)
POTASSIUM SERPL-SCNC: 4.5 MMOL/L (ref 3.5–5.3)
PROT SERPL-MCNC: 6.2 G/DL (ref 6.1–8.1)
PSA SERPL-MCNC: 0.91 NG/ML
RBC # BLD AUTO: 4.99 MILLION/UL (ref 4.2–5.8)
SODIUM SERPL-SCNC: 138 MMOL/L (ref 135–146)
TRIGL SERPL-MCNC: 35 MG/DL
TSH SERPL-ACNC: 1.36 MIU/L (ref 0.4–4.5)
WBC # BLD AUTO: 11.4 THOUSAND/UL (ref 3.8–10.8)

## 2025-02-23 DIAGNOSIS — D72.829 LEUKOCYTOSIS, UNSPECIFIED TYPE: Primary | ICD-10-CM

## 2025-03-26 LAB
BASOPHILS # BLD AUTO: 39 CELLS/UL (ref 0–200)
BASOPHILS NFR BLD AUTO: 0.5 %
EOSINOPHIL # BLD AUTO: 172 CELLS/UL (ref 15–500)
EOSINOPHIL NFR BLD AUTO: 2.2 %
ERYTHROCYTE [DISTWIDTH] IN BLOOD BY AUTOMATED COUNT: 12.1 % (ref 11–15)
HCT VFR BLD AUTO: 43.3 % (ref 38.5–50)
HGB BLD-MCNC: 14.6 G/DL (ref 13.2–17.1)
LYMPHOCYTES # BLD AUTO: 2075 CELLS/UL (ref 850–3900)
LYMPHOCYTES NFR BLD AUTO: 26.6 %
MCH RBC QN AUTO: 30.2 PG (ref 27–33)
MCHC RBC AUTO-ENTMCNC: 33.7 G/DL (ref 32–36)
MCV RBC AUTO: 89.6 FL (ref 80–100)
MONOCYTES # BLD AUTO: 632 CELLS/UL (ref 200–950)
MONOCYTES NFR BLD AUTO: 8.1 %
NEUTROPHILS # BLD AUTO: 4883 CELLS/UL (ref 1500–7800)
NEUTROPHILS NFR BLD AUTO: 62.6 %
PLATELET # BLD AUTO: 256 THOUSAND/UL (ref 140–400)
PMV BLD REES-ECKER: 11.4 FL (ref 7.5–12.5)
RBC # BLD AUTO: 4.83 MILLION/UL (ref 4.2–5.8)
WBC # BLD AUTO: 7.8 THOUSAND/UL (ref 3.8–10.8)

## 2025-03-28 DIAGNOSIS — G47.33 OBSTRUCTIVE SLEEP APNEA: ICD-10-CM

## 2025-03-28 RX ORDER — TIRZEPATIDE 2.5 MG/.5ML
2.5 INJECTION, SOLUTION SUBCUTANEOUS
Qty: 2 ML | Refills: 0 | Status: SHIPPED | OUTPATIENT
Start: 2025-03-30

## 2025-04-16 DIAGNOSIS — G47.33 OBSTRUCTIVE SLEEP APNEA: ICD-10-CM

## 2025-04-16 RX ORDER — TIRZEPATIDE 2.5 MG/.5ML
INJECTION, SOLUTION SUBCUTANEOUS
Qty: 2 ML | Refills: 0 | OUTPATIENT
Start: 2025-04-16

## 2025-04-16 RX ORDER — TIRZEPATIDE 5 MG/.5ML
5 INJECTION, SOLUTION SUBCUTANEOUS WEEKLY
Qty: 2 ML | Refills: 0 | Status: SHIPPED | OUTPATIENT
Start: 2025-04-16

## 2025-05-18 NOTE — PROGRESS NOTES
Subjective    Patient ID: Tyrone Ma is a 70 y.o. male.    Chief Complaint: No chief complaint on file.     Last Surgery: No surgery found  Last Surgery Date: No surgery found    HPI  The patient is a pleasant 68 year-old right hand dominant male presenting today for evaluation of their right shoulder. He is presenting today with complaints of shoulder pain that has been progressively worsening. He has had the pain for the last 3-4 years. He does not feel that the pain is terrible, but his wife says he complains about it often. He has not tried injections yet but he has done therapy. He did not have success with therapy. He notes that his left shoulder is beginning to hurt.      11/07/23  Tyrone returns to the clinic today for a repeat follow up visit regarding his right shoulder.     He explains today that the injections are helping. He is hoping to have a repeat done today.     02/06/24  Tyrone returns to the clinic today for a follow up visit regarding his right shoulder.     He reports that the injections are continuing to take the edge off his shoulder pain.     05/07/24  Tyrone returns to the clinic today for a repeat follow up visit regarding his right shoulder.     He reports that injections are still beneficial. He notices when his injection is about to wear off as he starts to hurt.     08/13/24  Tyrone Ma is a 70 y.o. male returning to the clinic for a repeat follow up visit regarding his right shoulder.    He reports that his last injection just started wearing off about 10 days ago.      11/12/24  Tyrone Ma is a 70 y.o. male returning to the clinic for a repeat follow up visit regarding his right shoulder.     He returns today reporting that his shoulder is doing fair today. Injections continue to be helpful and he would like to continue these for now. They almost last him the full 3 months.     02/11/25  Tyrone Ma is a 70 y.o. male returning to the clinic for a follow up visit regarding  his right shoulder.    He is here today for a repeat injection. They continue to provide him with sustained relief.     5/17/25  Tyrone Ma is a 70 y.o. male returning to the clinic for a follow up visit regarding his right shoulder.     Past medical history, surgical history, social history, and family history were all reviewed and are as per the [facility] patient health history questionnaire form that I signed and scanned into the chart today.    Objective   Patient is a well-developed, well-nourished male in no acute distress. Breathes with normal chest rises. Pupils are round and symmetric today. Awake, alert, and oriented x3.     Examination of the left shoulder today reveals the skin to be intact. There is no sign any atrophy lesions or abrasions. There is no pain to palpation of the bony prominences. Cervical lymphadenopathy examined, and this was negative.   Patient had 5 out of 5 wrist flexors extension, thumb extension bilaterally. Sensation was intact to light touch to median, ulnar, radial axillary, and musculocutaneous nerves bilaterally. Positive radial pulse bilaterally. Provocative maneuvers on the left side today were negative.   Range of motion of the left shoulder revealed 0-170° of forward elevation. 0-45° of external rotation. Internal rotation was to T 12.      Examination of the right shoulder today reveals the skin to be intact. There is no sign any atrophy lesions or abrasions. There is no pain to palpation of the bony prominences. Cervical lymphadenopathy examined, and this was negative.   Patient had 5 out of 5 wrist flexors extension, thumb extension, bilaterally. Sensation was intact to light touch to median, ulnar, radial, axillary, and musculocutaneous nerves bilaterally. Positive radial pulse bilaterally. Provocative maneuvers on the right side today were negative.   Range of motion of the right shoulder revealed 0-130° of forward elevation, passively correctable to 17. 0-35° of external  rotation. And internal rotation up to T 12. Negative modified belly press test      Image Results:    L Inj/Asp: R glenohumeral on 5/20/2025 4:19 PM  Indications: pain  Details: 20 G needle, anterior approach  Medications: 40 mg triamcinolone acetonide 40 mg/mL; 4 mL lidocaine 10 mg/mL (1 %)  Outcome: tolerated well, no immediate complications  Procedure, treatment alternatives, risks and benefits explained, specific risks discussed. Consent was given by the patient. Immediately prior to procedure a time out was called to verify the correct patient, procedure, equipment, support staff and site/side marked as required. Patient was prepped and draped in the usual sterile fashion.             Assessment/Plan   Encounter Diagnoses:  No diagnosis found.  Patient with endstage arthritis of the right shoulder     At this time we had a long discussion about the various options for shoulder arthritis.      1. Do nothing and continue activity modifications.   2. Consider cortisone injections into the glenohumeral joint. This would give pain relief that is temporary. This would not stop the progression of arthritis. For some patients, this injection can provide no relief at all, relief for 2 weeks, 4 weeks, 3 months or longer. The risk of the injection is fairly minimal but does include included a very small chance of infection.   3. Option is a total shoulder replacement. This will give the patient a more permanent solution to pain relief. It would also improve function. There is no rush to this procedure it is an elective procedure.     He would like to proceed with an injection today for their RIGHT shoulder today. They tolerated the RIGHT shoulder injection very well today. We will see how they do after the injection. They are aware that there can be a spike in glucose levels following an injection.    At this time, we will also have the patient complete out-patient physical therapy as part of the conservative management  of their shoulder arthritis. They were given a prescription for this today. They were also given our provider directed home exercise program, along with correlating website for home therapy. The therapy should be performed 2-3 times a day and should take about 10-15 minutes to perform each time.     If they do not get sustained relief from the injection and therapy, consideration for a repeat injection can be made versus advanced imaging for preoperative planning. Again, there is no rush to surgery intervention.       No orders of the defined types were placed in this encounter.    Follow up in 3 months    Scribe Attestation  By signing my name below, IErica Scribe   attest that this documentation has been prepared under the direction and in the presence of Victor Hugo Somers MD.

## 2025-05-20 ENCOUNTER — OFFICE VISIT (OUTPATIENT)
Dept: ORTHOPEDIC SURGERY | Facility: HOSPITAL | Age: 70
End: 2025-05-20
Payer: MEDICARE

## 2025-05-20 DIAGNOSIS — M25.511 RIGHT SHOULDER PAIN, UNSPECIFIED CHRONICITY: Primary | ICD-10-CM

## 2025-05-20 PROCEDURE — 99213 OFFICE O/P EST LOW 20 MIN: CPT | Mod: 25 | Performed by: ORTHOPAEDIC SURGERY

## 2025-05-20 PROCEDURE — 99213 OFFICE O/P EST LOW 20 MIN: CPT | Performed by: ORTHOPAEDIC SURGERY

## 2025-05-20 PROCEDURE — 2500000004 HC RX 250 GENERAL PHARMACY W/ HCPCS (ALT 636 FOR OP/ED): Performed by: ORTHOPAEDIC SURGERY

## 2025-05-20 PROCEDURE — 20610 DRAIN/INJ JOINT/BURSA W/O US: CPT | Mod: RT | Performed by: ORTHOPAEDIC SURGERY

## 2025-05-20 RX ADMIN — TRIAMCINOLONE ACETONIDE 40 MG: 200 INJECTION, SUSPENSION INTRA-ARTICULAR; INTRAMUSCULAR at 16:19

## 2025-05-20 RX ADMIN — LIDOCAINE HYDROCHLORIDE 4 ML: 10 INJECTION, SOLUTION INFILTRATION; PERINEURAL at 16:19

## 2025-05-23 RX ORDER — TRIAMCINOLONE ACETONIDE 40 MG/ML
40 INJECTION, SUSPENSION INTRA-ARTICULAR; INTRAMUSCULAR
Status: COMPLETED | OUTPATIENT
Start: 2025-05-20 | End: 2025-05-20

## 2025-05-23 RX ORDER — LIDOCAINE HYDROCHLORIDE 10 MG/ML
4 INJECTION, SOLUTION INFILTRATION; PERINEURAL
Status: COMPLETED | OUTPATIENT
Start: 2025-05-20 | End: 2025-05-20

## 2025-06-01 ASSESSMENT — DERMATOLOGY QUALITY OF LIFE (QOL) ASSESSMENT
RATE HOW BOTHERED YOU ARE BY SYMPTOMS OF YOUR SKIN PROBLEM (EG, ITCHING, STINGING BURNING, HURTING OR SKIN IRRITATION): 0 - NEVER BOTHERED
RATE HOW BOTHERED YOU ARE BY SYMPTOMS OF YOUR SKIN PROBLEM (EG, ITCHING, STINGING BURNING, HURTING OR SKIN IRRITATION): 0 - NEVER BOTHERED
RATE HOW EMOTIONALLY BOTHERED YOU ARE BY YOUR SKIN PROBLEM (FOR EXAMPLE, WORRY, EMBARRASSMENT, FRUSTRATION): 0 - NEVER BOTHERED
WHAT SINGLE SKIN CONDITION LISTED BELOW IS THE PATIENT ANSWERING THE QUALITY-OF-LIFE ASSESSMENT QUESTIONS ABOUT: NONE OF THE ABOVE
RATE HOW BOTHERED YOU ARE BY EFFECTS OF YOUR SKIN PROBLEMS ON YOUR ACTIVITIES (EG, GOING OUT, ACCOMPLISHING WHAT YOU WANT, WORK ACTIVITIES OR YOUR RELATIONSHIPS WITH OTHERS): 0 - NEVER BOTHERED
RATE HOW BOTHERED YOU ARE BY EFFECTS OF YOUR SKIN PROBLEMS ON YOUR ACTIVITIES (EG, GOING OUT, ACCOMPLISHING WHAT YOU WANT, WORK ACTIVITIES OR YOUR RELATIONSHIPS WITH OTHERS): 0 - NEVER BOTHERED
WHAT SINGLE SKIN CONDITION LISTED BELOW IS THE PATIENT ANSWERING THE QUALITY-OF-LIFE ASSESSMENT QUESTIONS ABOUT: NONE OF THE ABOVE
RATE HOW EMOTIONALLY BOTHERED YOU ARE BY YOUR SKIN PROBLEM (FOR EXAMPLE, WORRY, EMBARRASSMENT, FRUSTRATION): 0 - NEVER BOTHERED

## 2025-06-01 ASSESSMENT — PATIENT GLOBAL ASSESSMENT (PGA): WHAT IS THE PGA: PATIENT GLOBAL ASSESSMENT:  1 - CLEAR

## 2025-06-02 ENCOUNTER — APPOINTMENT (OUTPATIENT)
Dept: DERMATOLOGY | Facility: CLINIC | Age: 70
End: 2025-06-02
Payer: MEDICARE

## 2025-06-02 DIAGNOSIS — Z86.018 HISTORY OF DYSPLASTIC NEVUS: ICD-10-CM

## 2025-06-02 DIAGNOSIS — D48.5 NEOPLASM OF UNCERTAIN BEHAVIOR OF SKIN: Primary | ICD-10-CM

## 2025-06-02 DIAGNOSIS — L73.9 FOLLICULITIS: ICD-10-CM

## 2025-06-02 DIAGNOSIS — L81.4 LENTIGO: ICD-10-CM

## 2025-06-02 DIAGNOSIS — L57.0 ACTINIC KERATOSIS: ICD-10-CM

## 2025-06-02 DIAGNOSIS — L82.1 SEBORRHEIC KERATOSIS: ICD-10-CM

## 2025-06-02 DIAGNOSIS — D22.5 MELANOCYTIC NEVUS OF TRUNK: ICD-10-CM

## 2025-06-02 DIAGNOSIS — L57.8 DIFFUSE PHOTODAMAGE OF SKIN: ICD-10-CM

## 2025-06-02 PROCEDURE — 1159F MED LIST DOCD IN RCRD: CPT | Performed by: DERMATOLOGY

## 2025-06-02 PROCEDURE — 99214 OFFICE O/P EST MOD 30 MIN: CPT | Performed by: DERMATOLOGY

## 2025-06-02 PROCEDURE — 11301 SHAVE SKIN LESION 0.6-1.0 CM: CPT | Performed by: DERMATOLOGY

## 2025-06-02 PROCEDURE — 11306 SHAVE SKIN LESION 0.6-1.0 CM: CPT | Performed by: DERMATOLOGY

## 2025-06-02 PROCEDURE — 17004 DESTROY PREMAL LESIONS 15/>: CPT | Performed by: DERMATOLOGY

## 2025-06-02 ASSESSMENT — DERMATOLOGY PATIENT ASSESSMENT
HAVE YOU HAD OR DO YOU HAVE VASCULAR DISEASE: NO
DO YOU HAVE ANY NEW OR CHANGING LESIONS: NO
DO YOU USE SUNSCREEN: OCCASIONALLY
HAVE YOU HAD OR DO YOU HAVE A STAPH INFECTION: NO
ARE YOU AN ORGAN TRANSPLANT RECIPIENT: NO
DO YOU USE A TANNING BED: NO

## 2025-06-02 ASSESSMENT — DERMATOLOGY QUALITY OF LIFE (QOL) ASSESSMENT

## 2025-06-02 ASSESSMENT — ITCH NUMERIC RATING SCALE: HOW SEVERE IS YOUR ITCHING?: 0

## 2025-06-02 ASSESSMENT — PATIENT GLOBAL ASSESSMENT (PGA): PATIENT GLOBAL ASSESSMENT: PATIENT GLOBAL ASSESSMENT:  1 - CLEAR

## 2025-06-02 NOTE — Clinical Note
Scattered on the patient's back and bilateral shoulders, there are several follicular-based erythematous, inflammatory papules and pustules

## 2025-06-02 NOTE — Clinical Note
Solar Lentigines and photodamage.  The clinically benign-appearing nature of these lesions, with the exception of the one noted above, and their relation to chronic sun exposure were discussed with the patient today and reassurance provided.  None of these lesions meet threshold for biopsy today, and thus no treatment is medically indicated for these lesions at this time.  The signs and symptoms of skin cancer were reviewed and the patient was advised to practice sun protection and sun avoidance, use daily sunscreen, and perform regular self skin exams.  The patient was instructed to monitor these lesions for any changes, such as in size, shape, or color, or associated symptoms and to call our office to schedule a return visit for re-evaluation if any such changes or symptoms are noticed in the future.  The patient expressed understanding and is in agreement with this plan.

## 2025-06-02 NOTE — Clinical Note
8 mm dark brown pigmented, asymmetric macule with an asymmetric pigment network and irregular borders

## 2025-06-02 NOTE — Clinical Note
Actinic Keratoses -scattered on scalp and face.  The pre-cancerous nature of these lesions and treatment options were discussed with the patient today.  At this time, I recommend treatment with liquid nitrogen cryotherapy.  The patient expressed understanding, is in agreement with this plan, and wishes to proceed with cryotherapy today.

## 2025-06-02 NOTE — PROGRESS NOTES
Subjective     Tyrone Ma is a 70 y.o. male who presents for the following: Skin Check.  He and his wife report recent pimple breakouts on his back.  He denies any new, changing, or concerning skin lesions since his last visit; no bleeding, itching, or burning lesions.      Review of Systems:  No other skin or systemic complaints other than what is documented elsewhere in the note.    The following portions of the chart were reviewed this encounter and updated as appropriate:       Skin Cancer History  Biopsy Log Book  No skin cancers from Specimen Tracking.    Additional History      Specialty Problems          Dermatology Problems    Pigmented skin lesion       Past Dermatologic / Past Relevant Medical History:    - history of AKs  - epidermal acanthosis, increased basilar melanin pigmentation, and occasional moderately atypical junctional melanocytes on right proximal dorsal forearm diagnosed on 6/23/21 s/p wide local excision with 5-mm margins by Dr. Landers on 9/8/21  - folliculitis  - no h/o atypical nevi or skin cancer     Family History:    No family history of melanoma or skin cancer    Social History:    The patient is retired from working in delivery for [employer] and is a [facility] fan; he is accompanied by his wife, tori Apple    Allergies:  Patient has no known allergies.    Current Medications / CAM's:  Current Medications[1]     Objective   Well appearing patient in no apparent distress; mood and affect are within normal limits.    A full examination was performed including scalp, face, eyes, ears, nose, lips, neck, chest, axillae, abdomen, back, bilateral upper extremities, and bilateral lower extremities. All findings within normal limits unless otherwise noted below.    Assessment/Plan   Skin Exam  1. NEOPLASM OF UNCERTAIN BEHAVIOR OF SKIN (2)  Left Distal Shoulder  6 mm dark brown pigmented, asymmetric macule with an asymmetric pigment network and irregular borders     Shave  removal    Lesion length (cm):  0.6  Margin per side (cm):  0.2  Lesion diameter (cm):  1  Informed consent: discussed and consent obtained    Timeout: patient name, date of birth, surgical site, and procedure verified    Procedure prep:  Patient was prepped and draped  Anesthesia: the lesion was anesthetized in a standard fashion    Anesthetic:  1% lidocaine w/ epinephrine 1-100,000 local infiltration  Instrument used: flexible razor blade    Hemostasis achieved with: aluminum chloride    Outcome: patient tolerated procedure well    Post-procedure details: sterile dressing applied and wound care instructions given    Dressing type: bandage and petrolatum      Staff Communication: Dermatology Local Anesthesia: 1 % Lidocaine / Epinephrine - Amount:0.5ml  Specimen 1 - Dermatopathology- DERM LAB  Differential Diagnosis: DN  Check Margins Yes/No?:    Comments:    Dermpath Lab: Routine Histopathology (formalin-fixed tissue)  Right Lateral Frontal Scalp  8 mm dark brown pigmented, asymmetric macule with an asymmetric pigment network and irregular borders     Shave removal    Lesion length (cm):  0.8  Margin per side (cm):  0  Lesion diameter (cm):  0.8  Informed consent: discussed and consent obtained    Timeout: patient name, date of birth, surgical site, and procedure verified    Procedure prep:  Patient was prepped and draped  Anesthesia: the lesion was anesthetized in a standard fashion    Anesthetic:  1% lidocaine w/ epinephrine 1-100,000 local infiltration  Instrument used: flexible razor blade    Hemostasis achieved with: aluminum chloride    Outcome: patient tolerated procedure well    Post-procedure details: sterile dressing applied and wound care instructions given    Dressing type: bandage and petrolatum      Staff Communication: Dermatology Local Anesthesia: 1 % Lidocaine / Epinephrine - Amount:0.5ml  Specimen 2 - Dermatopathology- DERM LAB  Differential Diagnosis: SK v [facility]  Check Margins Yes/No?:    Comments:     Dermpath Lab: Routine Histopathology (formalin-fixed tissue)  2. ACTINIC KERATOSIS (18)  Scalp (18)  Scattered on the patient's scalp and face, there are multiple erythematous, gritty, scaly macules   Actinic Keratoses -scattered on scalp and face.  The pre-cancerous nature of these lesions and treatment options were discussed with the patient today.  At this time, I recommend treatment with liquid nitrogen cryotherapy.  The patient expressed understanding, is in agreement with this plan, and wishes to proceed with cryotherapy today.  Destr of lesion - Scalp (18)  Complexity: simple    Destruction method: cryotherapy    Informed consent: discussed and consent obtained    Lesion destroyed using liquid nitrogen: Yes    Cryotherapy cycles:  1  Outcome: patient tolerated procedure well with no complications    Post-procedure details: wound care instructions given    3. MELANOCYTIC NEVUS OF TRUNK  Generalized  Scattered on the patient's face, neck, trunk, and extremities, there are multiple small, round- to oval-shaped, brown-pigmented and pink-colored, symmetric, uniform-appearing macules and dome-shaped papules  Clinically benign- to slightly atypical-appearing nevi - the clinically benign- to slightly atypical-appearing nature of the remainder of the patient's nevi was discussed with the patient today.  None of the patient's nevi, with the exception of the one noted above, meet threshold for biopsy today.  I emphasized the importance of performing monthly self-skin exams using the ABCDs of monitoring moles, which were reviewed with the patient today and an informational hand-out provided.  I also emphasized the importance of sun avoidance and sun protection with daily sunscreen use.  4. SEBORRHEIC KERATOSIS  Generalized  Scattered on the patient's face, neck, trunk, and extremities, there are multiple tan- to light brown-colored, hyperkeratotic, stuck-on appearing papules of varying size and shape  Seborrheic  To feel better and go home Keratoses - the benign nature of these lesions was discussed with the patient today and reassurance provided.  No treatment is medically indicated for these lesions at this time.  5. LENTIGO  Photodistributed  Multiple tan- to light brown-colored, round- to oval-shaped, symmetric and uniform-appearing macules and small patches consistent with lentigines scattered in sun-exposed areas.  Solar Lentigines and photodamage.  The clinically benign-appearing nature of these lesions, with the exception of the one noted above, and their relation to chronic sun exposure were discussed with the patient today and reassurance provided.  None of these lesions meet threshold for biopsy today, and thus no treatment is medically indicated for these lesions at this time.  The signs and symptoms of skin cancer were reviewed and the patient was advised to practice sun protection and sun avoidance, use daily sunscreen, and perform regular self skin exams.  The patient was instructed to monitor these lesions for any changes, such as in size, shape, or color, or associated symptoms and to call our office to schedule a return visit for re-evaluation if any such changes or symptoms are noticed in the future.  The patient expressed understanding and is in agreement with this plan.  6. FOLLICULITIS  Left Upper Back  Scattered on the patient's back and bilateral shoulders, there are several follicular-based erythematous, inflammatory papules and pustules  Folliculitis -flare on back and bilateral shoulders.  The bacterial nature of this condition and treatment options were discussed with the patient today.  At this time, I recommend topical antibiotic therapy with Clindamycin 1% lotion, which the patient was instructed to apply twice daily to the affected areas or up to 3-4 times per day as needed for active lesions.  The risks, benefits, and side effects of this medication were discussed.  The patient expressed understanding and is in  agreement with this plan.  7. HISTORY OF DYSPLASTIC NEVUS  Generalized  On the patient's right proximal dorsal forearm, there is a well-healed scar with no evidence of recurrent growth or pigmentation on exam today.  There is evidence of photodamage in sun-exposed areas.  History of actinic keratoses and photodamage.  There is no evidence of recurrence on exam today.  I emphasized the importance of continuing to perform monthly self-skin exams using the ABCDs of monitoring moles, which were reviewed with the patient, as well as the importance of sun avoidance and sun protection with daily sunscreen use.  I will have the patient return to our office in 6 to 12 months, pending the above biopsy results, for routine follow-up and skin exam, and the patient was instructed to call our office should the patient notice any new, changing, symptomatic, or otherwise concerning skin lesions before then.  The patient expressed understanding and is in agreement with this plan.  8. DIFFUSE PHOTODAMAGE OF SKIN  Photodistributed  Diffuse photodamage with actinic changes with telangiectasia and mottled pigmentation in sun-exposed areas.  Photodamage.  The signs and symptoms of skin cancer were reviewed and the patient was advised to practice sun protection and sun avoidance, use daily sunscreen, and perform regular self skin exams.  Sun protection was discussed, including avoiding the mid-day sun, wearing a sunscreen with SPF at least 50, and stressing the need for reapplication of sunscreen and applying more than they think they need.          [1]   Current Outpatient Medications:     famotidine (Pepcid) 40 mg tablet, Take 1 tablet (40 mg) by mouth once daily., Disp: 30 tablet, Rfl: 11    ketoconazole (NIZOral) 2 % cream, As directed, Disp: , Rfl:     pantoprazole (ProtoNix) 40 mg EC tablet, Take 1 tablet (40 mg) by mouth once daily. Do not crush, chew, or split., Disp: 90 tablet, Rfl: 3    rosuvastatin (Crestor) 10 mg tablet, Take  1 tablet (10 mg) by mouth once daily., Disp: 90 tablet, Rfl: 3    tirzepatide, weight loss, (Zepbound) 5 mg/0.5 mL solution, Inject 5 mg under the skin 1 (one) time per week., Disp: 2 mL, Rfl: 0

## 2025-06-02 NOTE — Clinical Note
Scattered on the patient's scalp and face, there are multiple erythematous, gritty, scaly macules

## 2025-06-02 NOTE — Clinical Note
History of actinic keratoses and photodamage.  There is no evidence of recurrence on exam today.  I emphasized the importance of continuing to perform monthly self-skin exams using the ABCDs of monitoring moles, which were reviewed with the patient, as well as the importance of sun avoidance and sun protection with daily sunscreen use.  I will have the patient return to our office in 6 to 12 months, pending the above biopsy results, for routine follow-up and skin exam, and the patient was instructed to call our office should the patient notice any new, changing, symptomatic, or otherwise concerning skin lesions before then.  The patient expressed understanding and is in agreement with this plan.

## 2025-06-02 NOTE — Clinical Note
On the patient's right proximal dorsal forearm, there is a well-healed scar with no evidence of recurrent growth or pigmentation on exam today.  There is evidence of photodamage in sun-exposed areas.

## 2025-06-04 LAB
LABORATORY COMMENT REPORT: NORMAL
PATH REPORT.FINAL DX SPEC: NORMAL
PATH REPORT.GROSS SPEC: NORMAL
PATH REPORT.MICROSCOPIC SPEC OTHER STN: NORMAL
PATH REPORT.RELEVANT HX SPEC: NORMAL
PATH REPORT.TOTAL CANCER: NORMAL

## 2025-07-07 DIAGNOSIS — M19.011 ARTHRITIS OF RIGHT SHOULDER REGION: Primary | ICD-10-CM

## 2025-07-29 ENCOUNTER — APPOINTMENT (OUTPATIENT)
Dept: RADIOLOGY | Facility: CLINIC | Age: 70
End: 2025-07-29
Payer: MEDICARE

## 2025-07-29 DIAGNOSIS — M19.011 ARTHRITIS OF RIGHT SHOULDER REGION: ICD-10-CM

## 2025-07-29 PROCEDURE — 73200 CT UPPER EXTREMITY W/O DYE: CPT | Mod: RT

## 2025-07-29 PROCEDURE — 73200 CT UPPER EXTREMITY W/O DYE: CPT | Mod: RIGHT SIDE | Performed by: STUDENT IN AN ORGANIZED HEALTH CARE EDUCATION/TRAINING PROGRAM

## 2025-08-04 DIAGNOSIS — M19.011 ARTHRITIS OF RIGHT SHOULDER: Primary | ICD-10-CM

## 2025-08-05 DIAGNOSIS — R13.19 ESOPHAGEAL DYSPHAGIA: ICD-10-CM

## 2025-08-05 DIAGNOSIS — K21.9 GASTROESOPHAGEAL REFLUX DISEASE WITHOUT ESOPHAGITIS: ICD-10-CM

## 2025-08-05 RX ORDER — FAMOTIDINE 40 MG/1
40 TABLET, FILM COATED ORAL DAILY
Qty: 90 TABLET | Refills: 0 | Status: SHIPPED | OUTPATIENT
Start: 2025-08-05

## 2025-08-14 ENCOUNTER — APPOINTMENT (OUTPATIENT)
Dept: PRIMARY CARE | Facility: CLINIC | Age: 70
End: 2025-08-14
Payer: MEDICARE

## 2025-08-14 VITALS
HEIGHT: 70 IN | OXYGEN SATURATION: 95 % | WEIGHT: 200 LBS | DIASTOLIC BLOOD PRESSURE: 90 MMHG | BODY MASS INDEX: 28.63 KG/M2 | HEART RATE: 47 BPM | SYSTOLIC BLOOD PRESSURE: 136 MMHG

## 2025-08-14 DIAGNOSIS — R93.1 ELEVATED CORONARY ARTERY CALCIUM SCORE: ICD-10-CM

## 2025-08-14 DIAGNOSIS — R73.09 ABNORMAL GLUCOSE: ICD-10-CM

## 2025-08-14 DIAGNOSIS — E78.00 PURE HYPERCHOLESTEROLEMIA: ICD-10-CM

## 2025-08-14 DIAGNOSIS — I10 ESSENTIAL (PRIMARY) HYPERTENSION: ICD-10-CM

## 2025-08-14 DIAGNOSIS — E66.01 SEVERE OBESITY (BMI 35.0-35.9 WITH COMORBIDITY) (MULTI): ICD-10-CM

## 2025-08-14 DIAGNOSIS — Z00.00 ROUTINE GENERAL MEDICAL EXAMINATION AT A HEALTH CARE FACILITY: Primary | ICD-10-CM

## 2025-08-14 LAB — POC HEMOGLOBIN A1C: 5.5 % (ref 4.2–6.5)

## 2025-08-14 PROCEDURE — 99214 OFFICE O/P EST MOD 30 MIN: CPT | Performed by: FAMILY MEDICINE

## 2025-08-14 PROCEDURE — 3080F DIAST BP >= 90 MM HG: CPT | Performed by: FAMILY MEDICINE

## 2025-08-14 PROCEDURE — 3075F SYST BP GE 130 - 139MM HG: CPT | Performed by: FAMILY MEDICINE

## 2025-08-14 PROCEDURE — 99397 PER PM REEVAL EST PAT 65+ YR: CPT | Performed by: FAMILY MEDICINE

## 2025-08-14 PROCEDURE — 1159F MED LIST DOCD IN RCRD: CPT | Performed by: FAMILY MEDICINE

## 2025-08-14 PROCEDURE — 83036 HEMOGLOBIN GLYCOSYLATED A1C: CPT | Performed by: FAMILY MEDICINE

## 2025-08-14 PROCEDURE — 3008F BODY MASS INDEX DOCD: CPT | Performed by: FAMILY MEDICINE

## 2025-08-14 RX ORDER — SEMAGLUTIDE 0.5 MG/.5ML
0.5 INJECTION, SOLUTION SUBCUTANEOUS WEEKLY
Qty: 2 ML | Refills: 0 | Status: SHIPPED | OUTPATIENT
Start: 2025-08-14 | End: 2025-09-05

## 2025-08-14 ASSESSMENT — ENCOUNTER SYMPTOMS
DEPRESSION: 0
OCCASIONAL FEELINGS OF UNSTEADINESS: 0
LOSS OF SENSATION IN FEET: 0

## 2025-08-15 LAB
ALBUMIN SERPL-MCNC: 4.4 G/DL (ref 3.6–5.1)
ALP SERPL-CCNC: 45 U/L (ref 35–144)
ALT SERPL-CCNC: 20 U/L (ref 9–46)
ANION GAP SERPL CALCULATED.4IONS-SCNC: 9 MMOL/L (CALC) (ref 7–17)
AST SERPL-CCNC: 21 U/L (ref 10–35)
BASOPHILS # BLD AUTO: 62 CELLS/UL (ref 0–200)
BASOPHILS NFR BLD AUTO: 0.8 %
BILIRUB SERPL-MCNC: 0.6 MG/DL (ref 0.2–1.2)
BUN SERPL-MCNC: 19 MG/DL (ref 7–25)
CALCIUM SERPL-MCNC: 9.1 MG/DL (ref 8.6–10.3)
CHLORIDE SERPL-SCNC: 106 MMOL/L (ref 98–110)
CHOLEST SERPL-MCNC: 158 MG/DL
CHOLEST/HDLC SERPL: 2.7 (CALC)
CO2 SERPL-SCNC: 25 MMOL/L (ref 20–32)
CREAT SERPL-MCNC: 0.83 MG/DL (ref 0.7–1.28)
EGFRCR SERPLBLD CKD-EPI 2021: 93 ML/MIN/1.73M2
EOSINOPHIL # BLD AUTO: 148 CELLS/UL (ref 15–500)
EOSINOPHIL NFR BLD AUTO: 1.9 %
ERYTHROCYTE [DISTWIDTH] IN BLOOD BY AUTOMATED COUNT: 12.3 % (ref 11–15)
GLUCOSE SERPL-MCNC: 81 MG/DL (ref 65–99)
HCT VFR BLD AUTO: 44.3 % (ref 38.5–50)
HDLC SERPL-MCNC: 58 MG/DL
HGB BLD-MCNC: 15.3 G/DL (ref 13.2–17.1)
LDLC SERPL CALC-MCNC: 81 MG/DL (CALC)
LYMPHOCYTES # BLD AUTO: 1810 CELLS/UL (ref 850–3900)
LYMPHOCYTES NFR BLD AUTO: 23.2 %
MCH RBC QN AUTO: 31.7 PG (ref 27–33)
MCHC RBC AUTO-ENTMCNC: 34.5 G/DL (ref 32–36)
MCV RBC AUTO: 91.7 FL (ref 80–100)
MONOCYTES # BLD AUTO: 788 CELLS/UL (ref 200–950)
MONOCYTES NFR BLD AUTO: 10.1 %
NEUTROPHILS # BLD AUTO: 4992 CELLS/UL (ref 1500–7800)
NEUTROPHILS NFR BLD AUTO: 64 %
NONHDLC SERPL-MCNC: 100 MG/DL (CALC)
PLATELET # BLD AUTO: 228 THOUSAND/UL (ref 140–400)
PMV BLD REES-ECKER: 11.3 FL (ref 7.5–12.5)
POTASSIUM SERPL-SCNC: 4.3 MMOL/L (ref 3.5–5.3)
PROT SERPL-MCNC: 6.7 G/DL (ref 6.1–8.1)
RBC # BLD AUTO: 4.83 MILLION/UL (ref 4.2–5.8)
SODIUM SERPL-SCNC: 140 MMOL/L (ref 135–146)
TRIGL SERPL-MCNC: 101 MG/DL
WBC # BLD AUTO: 7.8 THOUSAND/UL (ref 3.8–10.8)

## 2025-08-26 ENCOUNTER — APPOINTMENT (OUTPATIENT)
Dept: ORTHOPEDIC SURGERY | Facility: HOSPITAL | Age: 70
End: 2025-08-26
Payer: MEDICARE

## 2025-08-26 ENCOUNTER — OFFICE VISIT (OUTPATIENT)
Dept: ORTHOPEDIC SURGERY | Facility: HOSPITAL | Age: 70
End: 2025-08-26
Payer: MEDICARE

## 2025-08-26 VITALS — BODY MASS INDEX: 28.49 KG/M2 | WEIGHT: 199 LBS | HEIGHT: 70 IN

## 2025-08-26 DIAGNOSIS — M19.011 ARTHRITIS OF RIGHT SHOULDER: Primary | ICD-10-CM

## 2025-08-26 DIAGNOSIS — M19.011 ARTHRITIS OF RIGHT SHOULDER REGION: Primary | ICD-10-CM

## 2025-08-26 DIAGNOSIS — Z01.818 PRE-OP EVALUATION: ICD-10-CM

## 2025-08-26 PROCEDURE — 1036F TOBACCO NON-USER: CPT | Performed by: NURSE PRACTITIONER

## 2025-08-26 PROCEDURE — 99214 OFFICE O/P EST MOD 30 MIN: CPT | Performed by: NURSE PRACTITIONER

## 2025-08-26 PROCEDURE — 1159F MED LIST DOCD IN RCRD: CPT | Performed by: NURSE PRACTITIONER

## 2025-08-26 PROCEDURE — 3008F BODY MASS INDEX DOCD: CPT | Performed by: NURSE PRACTITIONER

## 2025-08-26 PROCEDURE — 1160F RVW MEDS BY RX/DR IN RCRD: CPT | Performed by: NURSE PRACTITIONER

## 2025-08-26 PROCEDURE — 99212 OFFICE O/P EST SF 10 MIN: CPT

## 2025-08-26 RX ORDER — TIRZEPATIDE 2.5 MG/.5ML
INJECTION, SOLUTION SUBCUTANEOUS
COMMUNITY

## 2025-08-26 ASSESSMENT — PAIN SCALES - GENERAL: PAINLEVEL_OUTOF10: 5 - MODERATE PAIN

## 2025-08-26 ASSESSMENT — PAIN - FUNCTIONAL ASSESSMENT: PAIN_FUNCTIONAL_ASSESSMENT: 0-10

## 2026-02-16 ENCOUNTER — APPOINTMENT (OUTPATIENT)
Dept: PRIMARY CARE | Facility: CLINIC | Age: 71
End: 2026-02-16
Payer: MEDICARE

## 2026-06-04 ENCOUNTER — APPOINTMENT (OUTPATIENT)
Dept: DERMATOLOGY | Facility: CLINIC | Age: 71
End: 2026-06-04
Payer: MEDICARE